# Patient Record
Sex: FEMALE | Race: WHITE | URBAN - METROPOLITAN AREA
[De-identification: names, ages, dates, MRNs, and addresses within clinical notes are randomized per-mention and may not be internally consistent; named-entity substitution may affect disease eponyms.]

---

## 2024-11-17 ENCOUNTER — HOSPITAL ENCOUNTER (INPATIENT)
Age: 24
LOS: 1 days | Discharge: HOME | DRG: 418 | End: 2024-11-19
Attending: EMERGENCY MEDICINE | Admitting: SURGERY
Payer: COMMERCIAL

## 2024-11-17 ENCOUNTER — APPOINTMENT (OUTPATIENT)
Dept: CT IMAGING | Age: 24
DRG: 418 | End: 2024-11-17
Payer: COMMERCIAL

## 2024-11-17 ENCOUNTER — APPOINTMENT (OUTPATIENT)
Dept: ULTRASOUND IMAGING | Age: 24
DRG: 418 | End: 2024-11-17
Payer: COMMERCIAL

## 2024-11-17 ENCOUNTER — APPOINTMENT (OUTPATIENT)
Dept: GENERAL RADIOLOGY | Age: 24
DRG: 418 | End: 2024-11-17
Payer: COMMERCIAL

## 2024-11-17 ENCOUNTER — HOSPITAL ENCOUNTER (EMERGENCY)
Age: 24
Discharge: HOME | DRG: 418 | End: 2024-11-17
Attending: EMERGENCY MEDICINE
Payer: COMMERCIAL

## 2024-11-17 VITALS
TEMPERATURE: 96.9 F | DIASTOLIC BLOOD PRESSURE: 100 MMHG | HEIGHT: 61 IN | RESPIRATION RATE: 18 BRPM | WEIGHT: 265.88 LBS | HEART RATE: 75 BPM | SYSTOLIC BLOOD PRESSURE: 143 MMHG | OXYGEN SATURATION: 97 % | BODY MASS INDEX: 50.2 KG/M2

## 2024-11-17 DIAGNOSIS — K80.20 SYMPTOMATIC CHOLELITHIASIS: ICD-10-CM

## 2024-11-17 DIAGNOSIS — R07.9 CHEST PAIN, UNSPECIFIED TYPE: Primary | ICD-10-CM

## 2024-11-17 DIAGNOSIS — K80.20 CALCULUS OF GALLBLADDER WITHOUT CHOLECYSTITIS WITHOUT OBSTRUCTION: Primary | ICD-10-CM

## 2024-11-17 LAB
ALBUMIN SERPL-MCNC: 4.7 G/DL (ref 3.5–5.2)
ALBUMIN/GLOB SERPL: 1.6 {RATIO} (ref 1–2.5)
ALP SERPL-CCNC: 66 U/L (ref 35–104)
ALT SERPL-CCNC: 100 U/L (ref 10–35)
ANION GAP SERPL CALCULATED.3IONS-SCNC: 12 MMOL/L (ref 9–16)
AST SERPL-CCNC: 54 U/L (ref 10–35)
BASOPHILS # BLD: 0.06 K/UL (ref 0–0.2)
BASOPHILS NFR BLD: 0 % (ref 0–2)
BILIRUB SERPL-MCNC: 0.4 MG/DL (ref 0–1.2)
BUN SERPL-MCNC: 11 MG/DL (ref 6–20)
CALCIUM SERPL-MCNC: 10.1 MG/DL (ref 8.6–10.4)
CHLORIDE SERPL-SCNC: 102 MMOL/L (ref 98–107)
CO2 SERPL-SCNC: 21 MMOL/L (ref 20–31)
CREAT SERPL-MCNC: 0.6 MG/DL (ref 0.6–0.9)
D DIMER PPP FEU-MCNC: 1.15 UG/ML FEU (ref 0–0.57)
EOSINOPHIL # BLD: 0.03 K/UL (ref 0–0.44)
EOSINOPHILS RELATIVE PERCENT: 0 % (ref 1–4)
ERYTHROCYTE [DISTWIDTH] IN BLOOD BY AUTOMATED COUNT: 12.4 % (ref 11.8–14.4)
GFR, ESTIMATED: >90 ML/MIN/1.73M2
GLUCOSE SERPL-MCNC: 147 MG/DL (ref 74–99)
HCG SERPL QL: NEGATIVE
HCT VFR BLD AUTO: 44.8 % (ref 36.3–47.1)
HGB BLD-MCNC: 14.8 G/DL (ref 11.9–15.1)
IMM GRANULOCYTES # BLD AUTO: 0.07 K/UL (ref 0–0.3)
IMM GRANULOCYTES NFR BLD: 1 %
LIPASE SERPL-CCNC: 42 U/L (ref 13–60)
LYMPHOCYTES NFR BLD: 2.18 K/UL (ref 1.1–3.7)
LYMPHOCYTES RELATIVE PERCENT: 16 % (ref 24–43)
MCH RBC QN AUTO: 29.5 PG (ref 25.2–33.5)
MCHC RBC AUTO-ENTMCNC: 33 G/DL (ref 28.4–34.8)
MCV RBC AUTO: 89.2 FL (ref 82.6–102.9)
MONOCYTES NFR BLD: 0.59 K/UL (ref 0.1–1.2)
MONOCYTES NFR BLD: 4 % (ref 3–12)
NEUTROPHILS NFR BLD: 79 % (ref 36–65)
NEUTS SEG NFR BLD: 10.76 K/UL (ref 1.5–8.1)
NRBC BLD-RTO: 0 PER 100 WBC
PLATELET # BLD AUTO: 346 K/UL (ref 138–453)
PMV BLD AUTO: 10.1 FL (ref 8.1–13.5)
POTASSIUM SERPL-SCNC: 3.8 MMOL/L (ref 3.7–5.3)
PROT SERPL-MCNC: 7.6 G/DL (ref 6.6–8.7)
RBC # BLD AUTO: 5.02 M/UL (ref 3.95–5.11)
SODIUM SERPL-SCNC: 135 MMOL/L (ref 136–145)
TROPONIN I SERPL HS-MCNC: <6 NG/L (ref 0–14)
WBC OTHER # BLD: 13.7 K/UL (ref 3.5–11.3)

## 2024-11-17 PROCEDURE — 99285 EMERGENCY DEPT VISIT HI MDM: CPT

## 2024-11-17 PROCEDURE — 2500000003 HC RX 250 WO HCPCS: Performed by: EMERGENCY MEDICINE

## 2024-11-17 PROCEDURE — 93005 ELECTROCARDIOGRAM TRACING: CPT

## 2024-11-17 PROCEDURE — 84484 ASSAY OF TROPONIN QUANT: CPT

## 2024-11-17 PROCEDURE — 2580000003 HC RX 258: Performed by: EMERGENCY MEDICINE

## 2024-11-17 PROCEDURE — 76705 ECHO EXAM OF ABDOMEN: CPT

## 2024-11-17 PROCEDURE — 85025 COMPLETE CBC W/AUTO DIFF WBC: CPT

## 2024-11-17 PROCEDURE — 84703 CHORIONIC GONADOTROPIN ASSAY: CPT

## 2024-11-17 PROCEDURE — 99284 EMERGENCY DEPT VISIT MOD MDM: CPT

## 2024-11-17 PROCEDURE — 6360000004 HC RX CONTRAST MEDICATION

## 2024-11-17 PROCEDURE — 6360000002 HC RX W HCPCS: Performed by: EMERGENCY MEDICINE

## 2024-11-17 PROCEDURE — 80053 COMPREHEN METABOLIC PANEL: CPT

## 2024-11-17 PROCEDURE — 71046 X-RAY EXAM CHEST 2 VIEWS: CPT

## 2024-11-17 PROCEDURE — 83690 ASSAY OF LIPASE: CPT

## 2024-11-17 PROCEDURE — 93005 ELECTROCARDIOGRAM TRACING: CPT | Performed by: EMERGENCY MEDICINE

## 2024-11-17 PROCEDURE — 6370000000 HC RX 637 (ALT 250 FOR IP)

## 2024-11-17 PROCEDURE — 96374 THER/PROPH/DIAG INJ IV PUSH: CPT

## 2024-11-17 PROCEDURE — 6360000002 HC RX W HCPCS

## 2024-11-17 PROCEDURE — 85379 FIBRIN DEGRADATION QUANT: CPT

## 2024-11-17 PROCEDURE — 71260 CT THORAX DX C+: CPT

## 2024-11-17 PROCEDURE — 96375 TX/PRO/DX INJ NEW DRUG ADDON: CPT

## 2024-11-17 RX ORDER — IOPAMIDOL 755 MG/ML
75 INJECTION, SOLUTION INTRAVASCULAR
Status: COMPLETED | OUTPATIENT
Start: 2024-11-17 | End: 2024-11-17

## 2024-11-17 RX ORDER — MAGNESIUM HYDROXIDE/ALUMINUM HYDROXICE/SIMETHICONE 120; 1200; 1200 MG/30ML; MG/30ML; MG/30ML
30 SUSPENSION ORAL ONCE
Status: COMPLETED | OUTPATIENT
Start: 2024-11-17 | End: 2024-11-17

## 2024-11-17 RX ORDER — FENTANYL CITRATE 50 UG/ML
50 INJECTION, SOLUTION INTRAMUSCULAR; INTRAVENOUS ONCE
Status: COMPLETED | OUTPATIENT
Start: 2024-11-17 | End: 2024-11-17

## 2024-11-17 RX ORDER — ONDANSETRON 2 MG/ML
4 INJECTION INTRAMUSCULAR; INTRAVENOUS ONCE
Status: COMPLETED | OUTPATIENT
Start: 2024-11-17 | End: 2024-11-17

## 2024-11-17 RX ORDER — ACETAMINOPHEN 500 MG
1000 TABLET ORAL ONCE
Status: COMPLETED | OUTPATIENT
Start: 2024-11-17 | End: 2024-11-17

## 2024-11-17 RX ORDER — FAMOTIDINE 20 MG/1
20 TABLET, FILM COATED ORAL 2 TIMES DAILY
Qty: 60 TABLET | Refills: 0 | Status: SHIPPED | OUTPATIENT
Start: 2024-11-17

## 2024-11-17 RX ADMIN — IOPAMIDOL 75 ML: 755 INJECTION, SOLUTION INTRAVENOUS at 21:27

## 2024-11-17 RX ADMIN — FENTANYL CITRATE 50 MCG: 50 INJECTION, SOLUTION INTRAMUSCULAR; INTRAVENOUS at 22:53

## 2024-11-17 RX ADMIN — ALUMINUM HYDROXIDE, MAGNESIUM HYDROXIDE, AND SIMETHICONE 30 ML: 200; 200; 20 SUSPENSION ORAL at 17:18

## 2024-11-17 RX ADMIN — ACETAMINOPHEN 1000 MG: 500 TABLET ORAL at 17:30

## 2024-11-17 RX ADMIN — ONDANSETRON 4 MG: 2 INJECTION INTRAMUSCULAR; INTRAVENOUS at 20:24

## 2024-11-17 RX ADMIN — FAMOTIDINE 20 MG: 10 INJECTION, SOLUTION INTRAVENOUS at 20:24

## 2024-11-17 ASSESSMENT — PAIN SCALES - GENERAL
PAINLEVEL_OUTOF10: 8
PAINLEVEL_OUTOF10: 6
PAINLEVEL_OUTOF10: 6
PAINLEVEL_OUTOF10: 7

## 2024-11-17 ASSESSMENT — PAIN DESCRIPTION - LOCATION
LOCATION: ABDOMEN
LOCATION: CHEST
LOCATION: CHEST

## 2024-11-17 ASSESSMENT — HEART SCORE: ECG: NORMAL

## 2024-11-17 ASSESSMENT — PAIN - FUNCTIONAL ASSESSMENT
PAIN_FUNCTIONAL_ASSESSMENT: 0-10
PAIN_FUNCTIONAL_ASSESSMENT: 0-10

## 2024-11-17 ASSESSMENT — LIFESTYLE VARIABLES: HOW OFTEN DO YOU HAVE A DRINK CONTAINING ALCOHOL: MONTHLY OR LESS

## 2024-11-17 NOTE — ED NOTES
Pt to ed with c/o chest heaviness, pressure.   Pt states the pain started at 1000, pressure, between her breasts.   Pt states she has insomnia, was getting ready for bed, laying in her bed when the pain started. Pt states she was able to get some sleep but the pain never full went away. Pt reports pain is worse with exertion.   Pt is alert, oriented, speaking in full, complete sentences.   Pt rates pain 6/10  Pt does not like needles, will wait for resident evaluation for orders.

## 2024-11-17 NOTE — ED PROVIDER NOTES
Physical Activity: Not on file   Stress: Not on file   Social Connections: Not on file   Intimate Partner Violence: Not on file   Housing Stability: Not on file       History reviewed. No pertinent family history.    Allergies:  Patient has no known allergies.    Home Medications:  Prior to Admission medications    Not on File         REVIEW OF SYSTEMS       Review of Systems   Constitutional:  Negative for chills and fever.   Respiratory:  Negative for shortness of breath.    Cardiovascular:  Positive for chest pain.   Gastrointestinal:  Negative for nausea and vomiting.       PHYSICAL EXAM      INITIAL VITALS:   BP (!) 143/100   Pulse 75   Temp 96.9 °F (36.1 °C) (Oral)   Resp 18   Ht 1.549 m (5' 1\")   Wt 120.6 kg (265 lb 14 oz)   SpO2 97%   BMI 50.24 kg/m²     Physical Exam  Constitutional:       General: She is not in acute distress.     Appearance: She is not ill-appearing.   HENT:      Head: Normocephalic and atraumatic.   Eyes:      Extraocular Movements: Extraocular movements intact.      Pupils: Pupils are equal, round, and reactive to light.   Cardiovascular:      Rate and Rhythm: Normal rate and regular rhythm.      Pulses: Normal pulses.      Heart sounds: Normal heart sounds. No murmur heard.  Pulmonary:      Effort: Pulmonary effort is normal. No respiratory distress.      Breath sounds: Normal breath sounds. No wheezing.   Abdominal:      General: Abdomen is flat. There is no distension.      Palpations: Abdomen is soft.      Tenderness: There is no abdominal tenderness.   Musculoskeletal:      Right lower leg: No edema.      Left lower leg: No edema.           DDX/DIAGNOSTIC RESULTS / EMERGENCY DEPARTMENT COURSE / MDM     Medical Decision Making  24-year-old female presents emergency department with midsternal chest pain.  On exam patient is nontoxic and well-appearing.  Patient has stable vital signs, patient is afebrile.  Patient has clear lung sounds bilaterally, abdomen is soft nontender  nondistended, moist mucous membranes.  Remainder of patient's physical exam is unremarkable.  Differential diagnosis: ACS/STEMI/NSTEMI, PE, pneumonia, musculoskeletal, GERD  Patient is PERC negative due to this PE is significantly less likely.  ACS/STEMI/NSTEMI is on the differential but is significantly less likely due to patient's age risk factors and normal physical exam.  Pneumonia is on the differential but is significantly less likely due to patient's normal vital signs, normal physical exam.  Most likely cause the patient's symptoms is GERD as patient had some relief in symptoms after Maalox administration.    Amount and/or Complexity of Data Reviewed  Radiology: ordered. Decision-making details documented in ED Course.  ECG/medicine tests: ordered.    Risk  OTC drugs.        EKG  Ventricular rate 73, QTc 427, QRS 76  Normal sinus rhythm, normal axis  T wave inversion in V1  No prior EKGs to compare to  Abnormal EKG    All EKG's are interpreted by the Emergency Department Physician who either signs or Co-signs this chart in the absence of a cardiologist.    EMERGENCY DEPARTMENT COURSE:      ED Course as of 11/17/24 1729   Sun Nov 17, 2024   1710 XR CHEST (2 VW)  IMPRESSION:  No acute process.   [MW]   1724 Patient's EKG and chest x-ray were unremarkable. [MW]   1725 Patient's heart score was 1.  At this time patient will be discharged with instructions to follow-up with PCP. [MW]      ED Course User Index  [MW] Ruddy Joyce MD       PROCEDURES:      CONSULTS:  None    CRITICAL CARE:  There was significant risk of life threatening deterioration of patient's condition requiring my direct management. Critical care time  minutes, excluding any documented procedures.    FINAL IMPRESSION      1. Chest pain, unspecified type          DISPOSITION / PLAN     DISPOSITION Decision To Discharge 11/17/2024 05:27:40 PM           PATIENT REFERRED TO:  [facility]  [address] [place] [address]

## 2024-11-17 NOTE — ED PROVIDER NOTES
[facility]  Emergency Department  Faculty Attestation     I performed a history and physical examination of the patient and discussed management with the resident. I reviewed the resident’s note and agree with the documented findings and plan of care. Any areas of disagreement are noted on the chart. I was personally present for the key portions of any procedures. I have documented in the chart those procedures where I was not present during the key portions. I have reviewed the emergency nurses triage note. I agree with the chief complaint, past medical history, past surgical history, allergies, medications, social and family history as documented unless otherwise noted below.    For Physician Assistant/ Nurse Practitioner cases/documentation I have personally evaluated this patient and have completed at least one if not all key elements of the E/M (history, physical exam, and MDM). Additional findings are as noted.    Preliminary note started at 4:58 PM EST    Primary Care Physician:  No primary care provider on file.    Screenings:  [unfilled]    CHIEF COMPLAINT       Chief Complaint   Patient presents with    Chest Pain       RECENT VITALS:   BP (!) 143/100   Pulse 75   Temp 96.9 °F (36.1 °C) (Oral)   Resp 18   Ht 1.549 m (5' 1\")   Wt 120.6 kg (265 lb 14 oz)   SpO2 97%   BMI 50.24 kg/m²     LABS:  Labs Reviewed - No data to display    Radiology  XR CHEST (2 VW)    (Results Pending)         EKG:  EKG Interpretation    Interpreted by me    Rhythm: normal sinus   Rate: normal  Axis: normal  Ectopy: none  Conduction: normal  ST Segments: no acute change  T Waves: no acute change  Q Waves: none    Clinical Impression: no acute changes and normal EKG    Attending Physician Additional  Notes    Patient is had well localized lower esophageal upper epigastrium discomfort with radiation along the costal margins since this morning associate with nausea but no vomiting.  No

## 2024-11-18 ENCOUNTER — ANESTHESIA EVENT (OUTPATIENT)
Dept: OPERATING ROOM | Age: 24
DRG: 418 | End: 2024-11-18
Payer: MEDICAID

## 2024-11-18 ENCOUNTER — ANESTHESIA (OUTPATIENT)
Dept: OPERATING ROOM | Age: 24
DRG: 418 | End: 2024-11-18
Payer: MEDICAID

## 2024-11-18 PROBLEM — K80.20 SYMPTOMATIC CHOLELITHIASIS: Status: ACTIVE | Noted: 2024-11-18

## 2024-11-18 LAB
ALBUMIN SERPL-MCNC: 3.9 G/DL (ref 3.5–5.2)
ALBUMIN/GLOB SERPL: 1.3 {RATIO} (ref 1–2.5)
ALP SERPL-CCNC: 57 U/L (ref 35–104)
ALT SERPL-CCNC: 75 U/L (ref 10–35)
ANION GAP SERPL CALCULATED.3IONS-SCNC: 12 MMOL/L (ref 9–16)
AST SERPL-CCNC: 38 U/L (ref 10–35)
BACTERIA URNS QL MICRO: NORMAL
BASOPHILS # BLD: 0.09 K/UL (ref 0–0.2)
BASOPHILS NFR BLD: 1 % (ref 0–2)
BILIRUB DIRECT SERPL-MCNC: 0.2 MG/DL (ref 0–0.2)
BILIRUB INDIRECT SERPL-MCNC: 0.4 MG/DL (ref 0–1)
BILIRUB SERPL-MCNC: 0.6 MG/DL (ref 0–1.2)
BILIRUB UR QL STRIP: NEGATIVE
BUN SERPL-MCNC: 8 MG/DL (ref 6–20)
CALCIUM SERPL-MCNC: 9.5 MG/DL (ref 8.6–10.4)
CASTS #/AREA URNS LPF: NORMAL /LPF (ref 0–8)
CHLORIDE SERPL-SCNC: 105 MMOL/L (ref 98–107)
CLARITY UR: CLEAR
CO2 SERPL-SCNC: 19 MMOL/L (ref 20–31)
COLOR UR: YELLOW
CREAT SERPL-MCNC: 0.6 MG/DL (ref 0.6–0.9)
EKG ATRIAL RATE: 69 BPM
EKG ATRIAL RATE: 73 BPM
EKG P AXIS: 10 DEGREES
EKG P AXIS: 23 DEGREES
EKG P-R INTERVAL: 142 MS
EKG P-R INTERVAL: 150 MS
EKG Q-T INTERVAL: 388 MS
EKG Q-T INTERVAL: 388 MS
EKG QRS DURATION: 76 MS
EKG QRS DURATION: 78 MS
EKG QTC CALCULATION (BAZETT): 415 MS
EKG QTC CALCULATION (BAZETT): 427 MS
EKG R AXIS: 10 DEGREES
EKG R AXIS: 17 DEGREES
EKG T AXIS: 18 DEGREES
EKG T AXIS: 5 DEGREES
EKG VENTRICULAR RATE: 69 BPM
EKG VENTRICULAR RATE: 73 BPM
EOSINOPHIL # BLD: 0.04 K/UL (ref 0–0.44)
EOSINOPHILS RELATIVE PERCENT: 0 % (ref 1–4)
EPI CELLS #/AREA URNS HPF: NORMAL /HPF (ref 0–5)
ERYTHROCYTE [DISTWIDTH] IN BLOOD BY AUTOMATED COUNT: 12.5 % (ref 11.8–14.4)
GFR, ESTIMATED: >90 ML/MIN/1.73M2
GLOBULIN SER CALC-MCNC: 3 G/DL
GLUCOSE SERPL-MCNC: 110 MG/DL (ref 74–99)
GLUCOSE UR STRIP-MCNC: NEGATIVE MG/DL
HCT VFR BLD AUTO: 45.8 % (ref 36.3–47.1)
HGB BLD-MCNC: 15.2 G/DL (ref 11.9–15.1)
HGB UR QL STRIP.AUTO: NEGATIVE
IMM GRANULOCYTES # BLD AUTO: 0.11 K/UL (ref 0–0.3)
IMM GRANULOCYTES NFR BLD: 1 %
KETONES UR STRIP-MCNC: ABNORMAL MG/DL
LEUKOCYTE ESTERASE UR QL STRIP: NEGATIVE
LYMPHOCYTES NFR BLD: 3.62 K/UL (ref 1.1–3.7)
LYMPHOCYTES RELATIVE PERCENT: 21 % (ref 24–43)
MAGNESIUM SERPL-MCNC: 2.3 MG/DL (ref 1.6–2.6)
MCH RBC QN AUTO: 29.3 PG (ref 25.2–33.5)
MCHC RBC AUTO-ENTMCNC: 33.2 G/DL (ref 28.4–34.8)
MCV RBC AUTO: 88.4 FL (ref 82.6–102.9)
MONOCYTES NFR BLD: 1.09 K/UL (ref 0.1–1.2)
MONOCYTES NFR BLD: 6 % (ref 3–12)
NEUTROPHILS NFR BLD: 71 % (ref 36–65)
NEUTS SEG NFR BLD: 12.07 K/UL (ref 1.5–8.1)
NITRITE UR QL STRIP: NEGATIVE
NRBC BLD-RTO: 0 PER 100 WBC
PH UR STRIP: 5.5 [PH] (ref 5–8)
PHOSPHATE SERPL-MCNC: 3.9 MG/DL (ref 2.5–4.5)
PLATELET # BLD AUTO: ABNORMAL K/UL (ref 138–453)
PLATELET, FLUORESCENCE: ABNORMAL K/UL (ref 138–453)
POTASSIUM SERPL-SCNC: 3.4 MMOL/L (ref 3.7–5.3)
PROT SERPL-MCNC: 6.9 G/DL (ref 6.6–8.7)
PROT UR STRIP-MCNC: ABNORMAL MG/DL
RBC # BLD AUTO: 5.18 M/UL (ref 3.95–5.11)
RBC #/AREA URNS HPF: NORMAL /HPF (ref 0–4)
SODIUM SERPL-SCNC: 136 MMOL/L (ref 136–145)
SP GR UR STRIP: 1.06 (ref 1–1.03)
UROBILINOGEN UR STRIP-ACNC: NORMAL EU/DL (ref 0–1)
WBC #/AREA URNS HPF: NORMAL /HPF (ref 0–5)
WBC OTHER # BLD: 17 K/UL (ref 3.5–11.3)

## 2024-11-18 PROCEDURE — 93010 ELECTROCARDIOGRAM REPORT: CPT | Performed by: INTERNAL MEDICINE

## 2024-11-18 PROCEDURE — 2500000003 HC RX 250 WO HCPCS: Performed by: SURGERY

## 2024-11-18 PROCEDURE — 6360000002 HC RX W HCPCS: Performed by: STUDENT IN AN ORGANIZED HEALTH CARE EDUCATION/TRAINING PROGRAM

## 2024-11-18 PROCEDURE — 85025 COMPLETE CBC W/AUTO DIFF WBC: CPT

## 2024-11-18 PROCEDURE — 7100000001 HC PACU RECOVERY - ADDTL 15 MIN: Performed by: SURGERY

## 2024-11-18 PROCEDURE — 0FT44ZZ RESECTION OF GALLBLADDER, PERCUTANEOUS ENDOSCOPIC APPROACH: ICD-10-PCS | Performed by: SURGERY

## 2024-11-18 PROCEDURE — 81001 URINALYSIS AUTO W/SCOPE: CPT

## 2024-11-18 PROCEDURE — C1889 IMPLANT/INSERT DEVICE, NOC: HCPCS | Performed by: SURGERY

## 2024-11-18 PROCEDURE — 6360000002 HC RX W HCPCS

## 2024-11-18 PROCEDURE — 36415 COLL VENOUS BLD VENIPUNCTURE: CPT

## 2024-11-18 PROCEDURE — 83735 ASSAY OF MAGNESIUM: CPT

## 2024-11-18 PROCEDURE — 3700000001 HC ADD 15 MINUTES (ANESTHESIA): Performed by: SURGERY

## 2024-11-18 PROCEDURE — 7100000000 HC PACU RECOVERY - FIRST 15 MIN: Performed by: SURGERY

## 2024-11-18 PROCEDURE — 96375 TX/PRO/DX INJ NEW DRUG ADDON: CPT

## 2024-11-18 PROCEDURE — 2580000003 HC RX 258: Performed by: STUDENT IN AN ORGANIZED HEALTH CARE EDUCATION/TRAINING PROGRAM

## 2024-11-18 PROCEDURE — 80048 BASIC METABOLIC PNL TOTAL CA: CPT

## 2024-11-18 PROCEDURE — 2500000003 HC RX 250 WO HCPCS

## 2024-11-18 PROCEDURE — 3700000000 HC ANESTHESIA ATTENDED CARE: Performed by: SURGERY

## 2024-11-18 PROCEDURE — 2709999900 HC NON-CHARGEABLE SUPPLY: Performed by: SURGERY

## 2024-11-18 PROCEDURE — S2900 ROBOTIC SURGICAL SYSTEM: HCPCS | Performed by: SURGERY

## 2024-11-18 PROCEDURE — 80076 HEPATIC FUNCTION PANEL: CPT

## 2024-11-18 PROCEDURE — 3600000009 HC SURGERY ROBOT BASE: Performed by: SURGERY

## 2024-11-18 PROCEDURE — 88304 TISSUE EXAM BY PATHOLOGIST: CPT

## 2024-11-18 PROCEDURE — 2580000003 HC RX 258

## 2024-11-18 PROCEDURE — 8E0W4CZ ROBOTIC ASSISTED PROCEDURE OF TRUNK REGION, PERCUTANEOUS ENDOSCOPIC APPROACH: ICD-10-PCS | Performed by: SURGERY

## 2024-11-18 PROCEDURE — 84100 ASSAY OF PHOSPHORUS: CPT

## 2024-11-18 PROCEDURE — 1200000000 HC SEMI PRIVATE

## 2024-11-18 PROCEDURE — 3600000019 HC SURGERY ROBOT ADDTL 15MIN: Performed by: SURGERY

## 2024-11-18 PROCEDURE — 99222 1ST HOSP IP/OBS MODERATE 55: CPT | Performed by: SURGERY

## 2024-11-18 PROCEDURE — 2580000003 HC RX 258: Performed by: SURGERY

## 2024-11-18 PROCEDURE — 2720000010 HC SURG SUPPLY STERILE: Performed by: SURGERY

## 2024-11-18 PROCEDURE — 47562 LAPAROSCOPIC CHOLECYSTECTOMY: CPT | Performed by: SURGERY

## 2024-11-18 PROCEDURE — 85055 RETICULATED PLATELET ASSAY: CPT

## 2024-11-18 DEVICE — HEMOLOK L 6 CLIPS/CART
Type: IMPLANTABLE DEVICE | Site: GALLBLADDER | Status: FUNCTIONAL
Brand: WECK

## 2024-11-18 RX ORDER — ENOXAPARIN SODIUM 100 MG/ML
40 INJECTION SUBCUTANEOUS DAILY
Status: DISCONTINUED | OUTPATIENT
Start: 2024-11-18 | End: 2024-11-18

## 2024-11-18 RX ORDER — DEXAMETHASONE SODIUM PHOSPHATE 10 MG/ML
INJECTION, SOLUTION INTRAMUSCULAR; INTRAVENOUS
Status: DISCONTINUED | OUTPATIENT
Start: 2024-11-18 | End: 2024-11-18 | Stop reason: SDUPTHER

## 2024-11-18 RX ORDER — OXYCODONE HYDROCHLORIDE 5 MG/1
5 TABLET ORAL EVERY 4 HOURS PRN
Status: DISCONTINUED | OUTPATIENT
Start: 2024-11-18 | End: 2024-11-19 | Stop reason: HOSPADM

## 2024-11-18 RX ORDER — NALOXONE HYDROCHLORIDE 0.4 MG/ML
INJECTION, SOLUTION INTRAMUSCULAR; INTRAVENOUS; SUBCUTANEOUS PRN
Status: CANCELLED | OUTPATIENT
Start: 2024-11-18

## 2024-11-18 RX ORDER — ONDANSETRON 4 MG/1
4 TABLET, ORALLY DISINTEGRATING ORAL EVERY 8 HOURS PRN
Status: DISCONTINUED | OUTPATIENT
Start: 2024-11-18 | End: 2024-11-19 | Stop reason: HOSPADM

## 2024-11-18 RX ORDER — GLYCOPYRROLATE 1 MG/5 ML
SYRINGE (ML) INTRAVENOUS
Status: DISCONTINUED | OUTPATIENT
Start: 2024-11-18 | End: 2024-11-18 | Stop reason: SDUPTHER

## 2024-11-18 RX ORDER — SODIUM CHLORIDE, SODIUM LACTATE, POTASSIUM CHLORIDE, CALCIUM CHLORIDE 600; 310; 30; 20 MG/100ML; MG/100ML; MG/100ML; MG/100ML
INJECTION, SOLUTION INTRAVENOUS CONTINUOUS
Status: CANCELLED | OUTPATIENT
Start: 2024-11-18

## 2024-11-18 RX ORDER — LABETALOL HYDROCHLORIDE 5 MG/ML
10 INJECTION, SOLUTION INTRAVENOUS
Status: CANCELLED | OUTPATIENT
Start: 2024-11-18

## 2024-11-18 RX ORDER — EPHEDRINE SULFATE/0.9% NACL/PF 25 MG/5 ML
SYRINGE (ML) INTRAVENOUS
Status: DISCONTINUED | OUTPATIENT
Start: 2024-11-18 | End: 2024-11-18 | Stop reason: SDUPTHER

## 2024-11-18 RX ORDER — MIDAZOLAM HYDROCHLORIDE 1 MG/ML
INJECTION, SOLUTION INTRAMUSCULAR; INTRAVENOUS
Status: DISCONTINUED | OUTPATIENT
Start: 2024-11-18 | End: 2024-11-18 | Stop reason: SDUPTHER

## 2024-11-18 RX ORDER — FENTANYL CITRATE 50 UG/ML
25 INJECTION, SOLUTION INTRAMUSCULAR; INTRAVENOUS EVERY 5 MIN PRN
Status: CANCELLED | OUTPATIENT
Start: 2024-11-18

## 2024-11-18 RX ORDER — BUPIVACAINE HYDROCHLORIDE AND EPINEPHRINE 5; 5 MG/ML; UG/ML
INJECTION, SOLUTION PERINEURAL PRN
Status: DISCONTINUED | OUTPATIENT
Start: 2024-11-18 | End: 2024-11-18 | Stop reason: ALTCHOICE

## 2024-11-18 RX ORDER — ACETAMINOPHEN 500 MG
1000 TABLET ORAL EVERY 6 HOURS SCHEDULED
Status: DISCONTINUED | OUTPATIENT
Start: 2024-11-18 | End: 2024-11-19 | Stop reason: HOSPADM

## 2024-11-18 RX ORDER — ONDANSETRON 2 MG/ML
INJECTION INTRAMUSCULAR; INTRAVENOUS
Status: DISCONTINUED | OUTPATIENT
Start: 2024-11-18 | End: 2024-11-18 | Stop reason: SDUPTHER

## 2024-11-18 RX ORDER — PROPOFOL 10 MG/ML
INJECTION, EMULSION INTRAVENOUS
Status: DISCONTINUED | OUTPATIENT
Start: 2024-11-18 | End: 2024-11-18 | Stop reason: SDUPTHER

## 2024-11-18 RX ORDER — FENTANYL CITRATE 50 UG/ML
INJECTION, SOLUTION INTRAMUSCULAR; INTRAVENOUS
Status: DISCONTINUED | OUTPATIENT
Start: 2024-11-18 | End: 2024-11-18 | Stop reason: SDUPTHER

## 2024-11-18 RX ORDER — SODIUM CHLORIDE 0.9 % (FLUSH) 0.9 %
5-40 SYRINGE (ML) INJECTION EVERY 12 HOURS SCHEDULED
Status: DISCONTINUED | OUTPATIENT
Start: 2024-11-18 | End: 2024-11-19 | Stop reason: HOSPADM

## 2024-11-18 RX ORDER — METHOCARBAMOL 750 MG/1
750 TABLET, FILM COATED ORAL EVERY 6 HOURS
Status: DISCONTINUED | OUTPATIENT
Start: 2024-11-18 | End: 2024-11-19 | Stop reason: HOSPADM

## 2024-11-18 RX ORDER — LIDOCAINE HYDROCHLORIDE 10 MG/ML
INJECTION, SOLUTION EPIDURAL; INFILTRATION; INTRACAUDAL; PERINEURAL
Status: DISCONTINUED | OUTPATIENT
Start: 2024-11-18 | End: 2024-11-18 | Stop reason: SDUPTHER

## 2024-11-18 RX ORDER — METOCLOPRAMIDE HYDROCHLORIDE 5 MG/ML
10 INJECTION INTRAMUSCULAR; INTRAVENOUS
Status: CANCELLED | OUTPATIENT
Start: 2024-11-18 | End: 2024-11-19

## 2024-11-18 RX ORDER — SODIUM CHLORIDE, SODIUM LACTATE, POTASSIUM CHLORIDE, CALCIUM CHLORIDE 600; 310; 30; 20 MG/100ML; MG/100ML; MG/100ML; MG/100ML
INJECTION, SOLUTION INTRAVENOUS CONTINUOUS
Status: DISCONTINUED | OUTPATIENT
Start: 2024-11-18 | End: 2024-11-18

## 2024-11-18 RX ORDER — SODIUM CHLORIDE 9 MG/ML
INJECTION, SOLUTION INTRAVENOUS PRN
Status: DISCONTINUED | OUTPATIENT
Start: 2024-11-18 | End: 2024-11-19 | Stop reason: HOSPADM

## 2024-11-18 RX ORDER — MORPHINE SULFATE 4 MG/ML
4 INJECTION INTRAVENOUS ONCE
Status: COMPLETED | OUTPATIENT
Start: 2024-11-18 | End: 2024-11-18

## 2024-11-18 RX ORDER — INDOCYANINE GREEN AND WATER 25 MG
5 KIT INJECTION ONCE
Status: COMPLETED | OUTPATIENT
Start: 2024-11-18 | End: 2024-11-18

## 2024-11-18 RX ORDER — MAGNESIUM HYDROXIDE 1200 MG/15ML
LIQUID ORAL CONTINUOUS PRN
Status: COMPLETED | OUTPATIENT
Start: 2024-11-18 | End: 2024-11-18

## 2024-11-18 RX ORDER — SENNA AND DOCUSATE SODIUM 50; 8.6 MG/1; MG/1
1 TABLET, FILM COATED ORAL 2 TIMES DAILY
Status: DISCONTINUED | OUTPATIENT
Start: 2024-11-18 | End: 2024-11-19 | Stop reason: HOSPADM

## 2024-11-18 RX ORDER — SODIUM CHLORIDE 0.9 % (FLUSH) 0.9 %
5-40 SYRINGE (ML) INJECTION PRN
Status: DISCONTINUED | OUTPATIENT
Start: 2024-11-18 | End: 2024-11-19 | Stop reason: HOSPADM

## 2024-11-18 RX ORDER — SODIUM CHLORIDE 0.9 % (FLUSH) 0.9 %
5-40 SYRINGE (ML) INJECTION EVERY 12 HOURS SCHEDULED
Status: CANCELLED | OUTPATIENT
Start: 2024-11-18

## 2024-11-18 RX ORDER — PROCHLORPERAZINE EDISYLATE 5 MG/ML
5 INJECTION INTRAMUSCULAR; INTRAVENOUS
Status: CANCELLED | OUTPATIENT
Start: 2024-11-18 | End: 2024-11-19

## 2024-11-18 RX ORDER — POLYETHYLENE GLYCOL 3350 17 G/17G
17 POWDER, FOR SOLUTION ORAL DAILY
Status: DISCONTINUED | OUTPATIENT
Start: 2024-11-18 | End: 2024-11-19 | Stop reason: HOSPADM

## 2024-11-18 RX ORDER — ENOXAPARIN SODIUM 100 MG/ML
40 INJECTION SUBCUTANEOUS 2 TIMES DAILY
Status: DISCONTINUED | OUTPATIENT
Start: 2024-11-18 | End: 2024-11-19 | Stop reason: HOSPADM

## 2024-11-18 RX ORDER — ONDANSETRON 2 MG/ML
4 INJECTION INTRAMUSCULAR; INTRAVENOUS EVERY 6 HOURS PRN
Status: DISCONTINUED | OUTPATIENT
Start: 2024-11-18 | End: 2024-11-19 | Stop reason: HOSPADM

## 2024-11-18 RX ORDER — ROCURONIUM BROMIDE 10 MG/ML
INJECTION, SOLUTION INTRAVENOUS
Status: DISCONTINUED | OUTPATIENT
Start: 2024-11-18 | End: 2024-11-18 | Stop reason: SDUPTHER

## 2024-11-18 RX ADMIN — DEXAMETHASONE SODIUM PHOSPHATE 10 MG: 10 INJECTION, SOLUTION INTRAMUSCULAR; INTRAVENOUS at 11:35

## 2024-11-18 RX ADMIN — MORPHINE SULFATE 4 MG: 4 INJECTION INTRAVENOUS at 01:33

## 2024-11-18 RX ADMIN — SODIUM CHLORIDE, PRESERVATIVE FREE 10 ML: 5 INJECTION INTRAVENOUS at 03:41

## 2024-11-18 RX ADMIN — ROCURONIUM BROMIDE 50 MG: 10 INJECTION, SOLUTION INTRAVENOUS at 11:24

## 2024-11-18 RX ADMIN — PHENYLEPHRINE HYDROCHLORIDE 100 MCG: 10 INJECTION INTRAVENOUS at 11:35

## 2024-11-18 RX ADMIN — PHENYLEPHRINE HYDROCHLORIDE 150 MCG: 10 INJECTION INTRAVENOUS at 11:50

## 2024-11-18 RX ADMIN — Medication 10 MG: at 12:24

## 2024-11-18 RX ADMIN — INDOCYANINE GREEN AND WATER 2.5 MG: KIT at 11:20

## 2024-11-18 RX ADMIN — SUGAMMADEX 300 MG: 100 INJECTION, SOLUTION INTRAVENOUS at 12:40

## 2024-11-18 RX ADMIN — FENTANYL CITRATE 100 MCG: 50 INJECTION, SOLUTION INTRAMUSCULAR; INTRAVENOUS at 11:24

## 2024-11-18 RX ADMIN — MIDAZOLAM 2 MG: 1 INJECTION INTRAMUSCULAR; INTRAVENOUS at 11:21

## 2024-11-18 RX ADMIN — SODIUM CHLORIDE, PRESERVATIVE FREE 10 ML: 5 INJECTION INTRAVENOUS at 20:38

## 2024-11-18 RX ADMIN — Medication 0.2 MG: at 11:24

## 2024-11-18 RX ADMIN — PROPOFOL 300 MG: 10 INJECTION, EMULSION INTRAVENOUS at 11:24

## 2024-11-18 RX ADMIN — PIPERACILLIN AND TAZOBACTAM 4500 MG: 4; .5 INJECTION, POWDER, FOR SOLUTION INTRAVENOUS at 15:04

## 2024-11-18 RX ADMIN — PIPERACILLIN AND TAZOBACTAM 4500 MG: 4; .5 INJECTION, POWDER, FOR SOLUTION INTRAVENOUS at 20:43

## 2024-11-18 RX ADMIN — SODIUM CHLORIDE, POTASSIUM CHLORIDE, SODIUM LACTATE AND CALCIUM CHLORIDE: 600; 310; 30; 20 INJECTION, SOLUTION INTRAVENOUS at 13:52

## 2024-11-18 RX ADMIN — PHENYLEPHRINE HYDROCHLORIDE 100 MCG: 10 INJECTION INTRAVENOUS at 12:25

## 2024-11-18 RX ADMIN — SODIUM CHLORIDE, POTASSIUM CHLORIDE, SODIUM LACTATE AND CALCIUM CHLORIDE: 600; 310; 30; 20 INJECTION, SOLUTION INTRAVENOUS at 04:04

## 2024-11-18 RX ADMIN — Medication 30 MG: at 11:40

## 2024-11-18 RX ADMIN — LIDOCAINE HYDROCHLORIDE 50 MG: 10 INJECTION, SOLUTION EPIDURAL; INFILTRATION; INTRACAUDAL; PERINEURAL at 11:24

## 2024-11-18 RX ADMIN — EPHEDRINE SULFATE 10 MG: 5 INJECTION INTRAVENOUS at 11:52

## 2024-11-18 RX ADMIN — SODIUM CHLORIDE, POTASSIUM CHLORIDE, SODIUM LACTATE AND CALCIUM CHLORIDE: 600; 310; 30; 20 INJECTION, SOLUTION INTRAVENOUS at 12:38

## 2024-11-18 RX ADMIN — ONDANSETRON 4 MG: 2 INJECTION INTRAMUSCULAR; INTRAVENOUS at 12:27

## 2024-11-18 RX ADMIN — Medication 2 G: at 11:35

## 2024-11-18 ASSESSMENT — PAIN SCALES - WONG BAKER
WONGBAKER_NUMERICALRESPONSE: NO HURT

## 2024-11-18 ASSESSMENT — PAIN DESCRIPTION - ONSET: ONSET: AWAKENED FROM SLEEP

## 2024-11-18 ASSESSMENT — PAIN DESCRIPTION - ORIENTATION
ORIENTATION: RIGHT;UPPER
ORIENTATION: RIGHT;UPPER

## 2024-11-18 ASSESSMENT — PAIN DESCRIPTION - LOCATION
LOCATION: ABDOMEN
LOCATION: ABDOMEN

## 2024-11-18 ASSESSMENT — PAIN DESCRIPTION - DESCRIPTORS
DESCRIPTORS: DULL;ACHING
DESCRIPTORS: ACHING;DULL

## 2024-11-18 ASSESSMENT — PAIN - FUNCTIONAL ASSESSMENT
PAIN_FUNCTIONAL_ASSESSMENT: ACTIVITIES ARE NOT PREVENTED
PAIN_FUNCTIONAL_ASSESSMENT: ACTIVITIES ARE NOT PREVENTED
PAIN_FUNCTIONAL_ASSESSMENT: 0-10

## 2024-11-18 ASSESSMENT — PAIN DESCRIPTION - PAIN TYPE: TYPE: ACUTE PAIN

## 2024-11-18 ASSESSMENT — PAIN DESCRIPTION - FREQUENCY: FREQUENCY: CONTINUOUS

## 2024-11-18 ASSESSMENT — PAIN SCALES - GENERAL
PAINLEVEL_OUTOF10: 1
PAINLEVEL_OUTOF10: 0
PAINLEVEL_OUTOF10: 1
PAINLEVEL_OUTOF10: 1
PAINLEVEL_OUTOF10: 0
PAINLEVEL_OUTOF10: 3

## 2024-11-18 ASSESSMENT — PAIN DESCRIPTION - DIRECTION: RADIATING_TOWARDS: DENIES

## 2024-11-18 ASSESSMENT — ENCOUNTER SYMPTOMS: NAUSEA: 1

## 2024-11-18 NOTE — ED PROVIDER NOTES
[facility]     Emergency Department     Faculty Attestation    I performed a history and physical examination of the patient and discussed management with the resident. I reviewed the resident’s note and agree with the documented findings and plan of care. Any areas of disagreement are noted on the chart. I was personally present for the key portions of any procedures. I have documented in the chart those procedures where I was not present during the key portions. I have reviewed the emergency nurses triage note. I agree with the chief complaint, past medical history, past surgical history, allergies, medications, social and family history as documented unless otherwise noted below.    For Physician Assistant/ Nurse Practitioner cases/documentation I have personally evaluated this patient and have completed at least one if not all key elements of the E/M (history, physical exam, and MDM). Additional findings are as noted.      Primary Care Physician:  No primary care provider on file.    CHIEF COMPLAINT       Chief Complaint   Patient presents with    Chest Pain    Nausea       RECENT VITALS:   Temp: 97.8 °F (36.6 °C),  Pulse: 98, Respirations: 20, BP: (!) 140/113    LABS:  Labs Reviewed - No data to display    Radiology  No orders to display         Attending Physician Additional  Notes    The patient is a 24-year-old female who presents for evaluation of chest pain.  She reports that she was seen earlier today for the same symptoms.  The patient had Chinese food for dinner last night.  She states that she woke up this morning with a sharp, stabbing, midsternal chest pain that radiates to her ribs when she takes a deep breath.  She complains of associated nausea but denies any vomiting.  She did not take any medications for symptoms at home.  When she was seen here in the emergency department she had some relief with Maalox and Tylenol.  Chest x-ray was unremarkable.  She was discharged

## 2024-11-18 NOTE — ED NOTES
The following labs were labeled with appropriate pt sticker and tubed to lab:     [x] Blue     [x] Lavender   [] on ice  [x] Green/yellow  [x] Green/black [] on ice  [] Meier  [] on ice  [x] Yellow  [] Red  [] Pink  [] Type/ Screen  [] ABG  [] VBG    [] COVID-19 swab    [] Rapid  [] PCR  [] Flu swab  [] Peds Viral Panel     [] Urine Sample  [] Fecal Sample  [] Pelvic Cultures  [] Blood Cultures  [] X 2  [] STREP Cultures

## 2024-11-18 NOTE — OP NOTE
Operative Note      Patient: Otoniel Vila  YOB: 2000  MRN: [id number]    Date of Procedure: 11/18/2024    Pre-Op Diagnosis Codes:      * Symptomatic cholelithiasis [K80.20]    Post-Op Diagnosis: Same and acute cholecystitis, hydrops gallbladder        Procedure(s):  ROBOTIC LAPAROSCOPIC CHOLECYSTECTOMY    Surgeon(s):  Danika Escalante MD    Assistant:   Resident: Monica Marquez DO    Anesthesia: General    Estimated Blood Loss (mL): less than 50     Complications: None    Specimens:   ID Type Source Tests Collected by Time Destination   A : GALLBLADDER AND CONTENTS Tissue Gallbladder SURGICAL PATHOLOGY Danika Escalante MD 11/18/2024 1157        Implants:  Implant Name Type Inv. Item Serial No.  Lot No. LRB No. Used Action   CLIP INT L POLYMER RENEE LIG HEM O RENEE (6EA/PK) - [id number]  CLIP INT L POLYMER RENEE LIG HEM O RENEE (6EA/PK)  [employer] MEDICAL- [id number] N/A 2 Implanted         Drains: * No LDAs found *    Findings:  Infection Present At Time Of Surgery (PATOS) (choose all levels that have infection present):  - Organ Space infection (below fascia) present as evidenced by fluid consistent with infection  Other Findings: hydrops gallbladder, acute cholecystitis     Detailed Description of Procedure:   Patient is a 24-year-old female with symptomatic cholelithiasis and acute cholecystitis.  Risks and benefits of robotic cholecystectomy, possible open were discussed with the patient and all questions were answered.  Patient provided written consent to proceed to the operating room.    Patient was brought to the operating room and placed in supine position.  General anesthesia was induced and endotracheal tube was used for airway control.  2 g of Ancef were given for antimicrobial coverage.  ICG was administered.  Timeout was performed confirming correct patient, procedure, allergies. Patient was prepped and draped in the usual sterile fashion.    Quarter percent Marcaine

## 2024-11-18 NOTE — ANESTHESIA PRE PROCEDURE
Q6H Jennifer Sandhu DO       • [Transfer Hold] enoxaparin (LOVENOX) injection 40 mg  40 mg SubCUTAneous Daily Jennifer Sandhu DO       • indocyanine green (IC-GREEN) syringe 5 mg  5 mg IntraVENous Once Danika Escalante MD       • sod chloride IRR soln 0.9 % irrigation    Continuous PRN Danika Escalante MD   1,000 mL at 11/18/24 1130       Allergies:  No Known Allergies    Problem List:    Patient Active Problem List   Diagnosis Code   • Calculus of gallbladder without cholecystitis without obstruction K80.20       Past Medical History:  No past medical history on file.    Past Surgical History:  No past surgical history on file.    Social History:    Social History     Tobacco Use   • Smoking status: Never   • Smokeless tobacco: Never   Substance Use Topics   • Alcohol use: Not Currently                                Counseling given: Not Answered      Vital Signs (Current):   Vitals:    11/17/24 2250 11/18/24 0315 11/18/24 0735 11/18/24 1035   BP: 122/82 116/84 131/78 (!) 139/94   Pulse: 85 84 79 (!) 102   Resp: 16 18 17 18   Temp:  98.1 °F (36.7 °C) 98.1 °F (36.7 °C) 96.8 °F (36 °C)   TempSrc:  Oral Oral Temporal   SpO2: 95% 100% 98% 95%   Weight:  119.5 kg (263 lb 7.2 oz)     Height:  1.549 m (5' 1\")                                                BP Readings from Last 3 Encounters:   11/18/24 (!) 139/94   11/17/24 (!) 143/100       NPO Status:                                                                                 BMI:   Wt Readings from Last 3 Encounters:   11/18/24 119.5 kg (263 lb 7.2 oz)   11/17/24 120.6 kg (265 lb 14 oz)     Body mass index is 49.78 kg/m².    CBC:   Lab Results   Component Value Date/Time    WBC 17.0 11/18/2024 06:41 AM    RBC 5.18 11/18/2024 06:41 AM    HGB 15.2 11/18/2024 06:41 AM    HCT 45.8 11/18/2024 06:41 AM    MCV 88.4 11/18/2024 06:41 AM    RDW 12.5 11/18/2024 06:41 AM    PLT See Reflexed IPF Result 11/18/2024 06:41 AM       CMP:   Lab Results   Component

## 2024-11-18 NOTE — ED NOTES
ED to inpatient nurses report      Chief Complaint:  Chief Complaint   Patient presents with    Nausea    Abdominal Pain     Upper abd     Present to ED from: Home    MOA:     LOC: alert and orientated to name, place, date  Mobility: Independent  Oxygen Baseline: RA    Current needs required: RA   Pending ED orders: na  Present condition: resting in ed cot    Why did the patient come to the ED? Pt arriving to ed 34 via triage with co of chest pain that started about an hour pta with nausea  Pt was recently seen this morning for same pain and was treated with GI meds and was ruled as GERD/acid reflux. Pt was unable to fill out prescriptions earlier today. No prior medical hx.   What is the plan? Symptom management for acute cholelithiasis and plan for surgery follow and possible removal of gall bladder   Any procedures or intervention occur? Line, labs, EKG, ct,     Mental Status:  Level of Consciousness: Alert (0)    Psych Assessment:   Psychosocial  Psychosocial (WDL): Within Defined Limits  Vital signs   Vitals:    11/17/24 2006 11/17/24 2133 11/17/24 2134 11/17/24 2250   BP: (!) 140/113 126/87  122/82   Pulse: 98  59 85   Resp: 20  14 16   Temp: 97.8 °F (36.6 °C)      TempSrc: Oral      SpO2: 98%  94% 95%   Weight: 120.6 kg (265 lb 14 oz)      Height: 1.549 m (5' 1\")           Vitals:  Patient Vitals for the past 24 hrs:   BP Temp Temp src Pulse Resp SpO2 Height Weight   11/17/24 2250 122/82 -- -- 85 16 95 % -- --   11/17/24 2134 -- -- -- 59 14 94 % -- --   11/17/24 2133 126/87 -- -- -- -- -- -- --   11/17/24 2006 (!) 140/113 97.8 °F (36.6 °C) Oral 98 20 98 % 1.549 m (5' 1\") 120.6 kg (265 lb 14 oz)      Visit Vitals  /82   Pulse 85   Temp 97.8 °F (36.6 °C) (Oral)   Resp 16   Ht 1.549 m (5' 1\")   Wt 120.6 kg (265 lb 14 oz)   SpO2 95%   BMI 50.24 kg/m²        LDAs:   Peripheral IV 11/17/24 Right Antecubital (Active)   Site Assessment Clean, dry & intact 11/17/24 2021   Line Status Brisk blood  following components:    D-Dimer, Quant 1.15 (*)     All other components within normal limits   LIPASE   HCG, SERUM, QUALITATIVE   TROPONIN   URINALYSIS WITH REFLEX TO CULTURE   BASIC METABOLIC PANEL W/ REFLEX TO MG FOR LOW K   CBC WITH AUTO DIFFERENTIAL   MAGNESIUM   PHOSPHORUS   HEPATIC FUNCTION PANEL       Electronically signed by Terry Guerrero RN on 11/18/2024 at 1:39 AM

## 2024-11-18 NOTE — BRIEF OP NOTE
Brief Postoperative Note      Patient: Otoniel Vila  YOB: 2000  MRN: [id number]    Date of Procedure: 11/18/2024    Pre-Op Diagnosis Codes:      * Symptomatic cholelithiasis [K80.20]    Post-Op Diagnosis: Same and acute cholecystitis, hydrops gallbladder       Procedure(s):  ROBOTIC LAPAROSCOPIC CHOLECYSTECTOMY    Surgeon(s):  Danika Escalante MD    Assistant:  Resident: Monica Marquez DO    Anesthesia: General    Estimated Blood Loss (mL): less than 50     Complications: None    Specimens:   ID Type Source Tests Collected by Time Destination   A : GALLBLADDER AND CONTENTS Tissue Gallbladder SURGICAL PATHOLOGY Danika Escalante MD 11/18/2024 1157        Implants:  Implant Name Type Inv. Item Serial No.  Lot No. LRB No. Used Action   CLIP INT L POLYMER RNEEE LIG HEM O RENEE (6EA/PK) - [id number]  CLIP INT L POLYMER RENEE LIG HEM O RENEE (6EA/PK)  [employer] MED[employer]- [id number] N/A 2 Implanted         Drains: * No LDAs found *    Findings:  Infection Present At Time Of Surgery (PATOS) (choose all levels that have infection present):  - Organ Space infection (below fascia) present as evidenced by fluid consistent with infection  Other Findings: hydrops gallbladder, acute cholecystitis    Electronically signed by Monica Marquez DO on 11/18/2024 at 12:53 PM

## 2024-11-18 NOTE — ED PROVIDER NOTES
[facility]   Emergency Department  Emergency Medicine Attending Sign-out   Note started: 11:27 PM EST    Care of Otoniel Vila was assumed from previous attending Dr. Campa at 11 PM and is being seen for Chest Pain and Nausea  .  The patient's initial evaluation and plan have been discussed with the prior provider who initially evaluated the patient.     Attestation  I was available and discussed any additional care issues that arose and coordinated the management plans with the resident(s) caring for the patient during my duty period. Any areas of disagreement with resident's documentation of care or procedures are noted on the chart. I was personally present for the key portions of any/all procedures, during my duty period. I have documented in the chart those procedures where I was not present during the key portions.     BRIEF PATIENT SUMMARY/MDM COURSE PER INITIAL PROVIDER:   RECENT VITALS:     Temp: 97.8 °F (36.6 °C),  Pulse: 85, Respirations: 16, BP: 122/82, SpO2: 95 %    This patient is a 24 y.o. Female with initial visit earlier seen for chest pain, at that time thought that she may have reflux and was given Maalox and Tylenol discharged home after feeling better.  However pain returned.  She had cardiac workup as well as a D-dimer.  D-dimer was elevated and therefore CT rule out PE was obtained which actually showed gallstones.  Has elevated white blood cell count at 13.7.  LFTs minimally elevated.    DIAGNOSTICS/MEDICATIONS:     MEDICATIONS GIVEN:  ED Medication Orders (From admission, onward)      Start Ordered     Status Ordering Provider    11/17/24 2300 11/17/24 2248  fentaNYL (SUBLIMAZE) injection 50 mcg  ONCE         Last MAR action: Given - by DENNY MANNING on 11/17/24 at 2253 MARIETTA GOTTLIEB    11/17/24 2118 11/17/24 2118  iopamidol (ISOVUE-370) 76 % injection 75 mL  IMG ONCE PRN         Last MAR action: Given - by DOLORES PALM on 11/17/24 at 2127 MARIETTA GOTTLIEB     11/17/24 2030 11/17/24 2019  famotidine (PEPCID) 20 mg in sodium chloride (PF) 0.9 % 10 mL injection  NOW         Last MAR action: Given - by YEIMI GOFF on 11/17/24 at 2024 DANIEL CARTWRIGHT    11/17/24 2030 11/17/24 2019  ondansetron (ZOFRAN) injection 4 mg  ONCE         Last MAR action: Given - by YEIMI GOFF on 11/17/24 at 2024 DANIEL CARTWRIGHT            LABS    Labs Reviewed   CBC WITH AUTO DIFFERENTIAL - Abnormal; Notable for the following components:       Result Value    WBC 13.7 (*)     Neutrophils % 79 (*)     Lymphocytes % 16 (*)     Eosinophils % 0 (*)     Immature Granulocytes % 1 (*)     Neutrophils Absolute 10.76 (*)     All other components within normal limits   COMPREHENSIVE METABOLIC PANEL - Abnormal; Notable for the following components:    Sodium 135 (*)     Glucose 147 (*)      (*)     AST 54 (*)     All other components within normal limits   D-DIMER, QUANTITATIVE - Abnormal; Notable for the following components:    D-Dimer, Quant 1.15 (*)     All other components within normal limits   LIPASE   HCG, SERUM, QUALITATIVE   TROPONIN       RADIOLOGY  CT CHEST PULMONARY EMBOLISM W CONTRAST    Result Date: 11/17/2024  EXAMINATION: CTA OF THE CHEST 11/17/2024 9:18 pm TECHNIQUE: CTA of the chest was performed after the administration of intravenous contrast.  Multiplanar reformatted images are provided for review.  MIP images are provided for review. Automated exposure control, iterative reconstruction, and/or weight based adjustment of the mA/kV was utilized to reduce the radiation dose to as low as reasonably achievable. COMPARISON: None. HISTORY: ORDERING SYSTEM PROVIDED HISTORY: Chest pain TECHNOLOGIST PROVIDED HISTORY: Chest pain Additional Contrast?->1 FINDINGS: Pulmonary Arteries: Pulmonary arteries are adequately opacified for evaluation.  No evidence of intraluminal filling defect to suggest pulmonary embolism.  Main pulmonary artery is normal in caliber. Mediastinum: No

## 2024-11-18 NOTE — ED PROVIDER NOTES
Constitutional:       Appearance: Normal appearance.   Cardiovascular:      Rate and Rhythm: Normal rate and regular rhythm.      Pulses: Normal pulses.      Heart sounds: Normal heart sounds.   Pulmonary:      Effort: Pulmonary effort is normal.      Breath sounds: Normal breath sounds and air entry.   Abdominal:      General: Abdomen is flat. Bowel sounds are normal.      Palpations: Abdomen is soft.      Tenderness: There is abdominal tenderness in the right upper quadrant. There is rebound. Positive signs include Hernandez's sign.   Neurological:      General: No focal deficit present.      Mental Status: She is alert.      GCS: GCS eye subscore is 4. GCS verbal subscore is 5. GCS motor subscore is 6.      Cranial Nerves: Cranial nerves 2-12 are intact.      Sensory: Sensation is intact.      Motor: Motor function is intact.           DDX/DIAGNOSTIC RESULTS / EMERGENCY DEPARTMENT COURSE / MDM     Medical Decision Making  Problem List / Differential Diagnosis: Differential diagnosis includes but is not limited to...    # ACS versus pulmonary embolism versus cholelithiasis versus acute cholecystitis versus GERD versus esophageal spasm    Plan: Troponin, lipase, CMP, D-dimer, hCG qualitative, CBC, CT chest pulmonary embolism with contrast    Reassessment / Response to Plan / Findings: In brief, 24-year-old female presenting to emergency department with a complaint of chest pain, difficulty with inhalation and nausea.    Will get troponin, lipase, CMP, CBC, hCG qualitative, D-dimer.  On patient's lab results, patient's D-dimer is 1.15, will add CT pulmonary embolism.  Patient CT pulmonary embolism is negative for pulmonary embolus although shows hepatic steatosis and cholelithiasis.  Will get ultrasound right upper quadrant and will give patient fentanyl 50 mcg and pain management.    Patient's ultrasound shows multiple tiny layering echogenic gallstones within the gallbladder.  There is no wall thickening or  Resident    (Please note that portions of thisnote were completed with a voice recognition program.  Efforts were made to edit the dictations but occasionally words are mis-transcribed.)

## 2024-11-18 NOTE — H&P
General Surgery  Consult    PATIENT NAME: Otoniel Vila  AGE: 24 y.o.  MEDICAL RECORD NO. [id number]  DATE: 11/18/2024  SURGEON: Kalpana Mackey MD  PRIMARY CARE PHYSICIAN: No primary care provider on file.    Patient evaluated at the request of  Dr. Szymanski  Reason for evaluation: cholelithiasis    Patient information was obtained from patient.  History/Exam limitations: none.    IMPRESSION:   25 yo female, with no pertinent medical history, presents with acute onset epigastric and right upper quadrant abdominal pain. Imaging shows cholelithiasis. Pt with leukocytosis on labs.    PLAN:   History, physical exam findings, and workup were discussed with the attending surgeon.   No emergent surgical intervention indicated at this time.  NPO, mIVF  UA pending  Tentative lap loc 11/18. Will obtain informed consent.      HISTORY:   History of Chief Complaint:    Otoniel Vila is a 24 y.o. female who presents with chest pain, acute onset epigastric and right upper quadrant abdominal pain. Pt states symptoms began yesterday after breakfast. Endorsing nausea, no emesis. Denies diarrhea, fever, or chills. Has not had abdominal pain like this before. Labs show leukocytosis, elevated D-dimer, elevated ALT and AST, no hyperbilirubinemia. Pt initially underwent cardiac workup with CT PE, which showed cholelithiasis, no pulmonary embolus. Was discharged but returned to ED as pain persisted. On my exam, she is afebrile, vital signs are within normal limits on room air. Pain is present but controlled with medications.      No prior cardiac history. Not on anticoagulation. Never had abdominal surgery.      Past Medical History   has no past medical history on file.  Past Surgical History   has no past surgical history on file.  Medications  Prior to Admission medications    Medication Sig Start Date End Date Taking? Authorizing Provider   famotidine (PEPCID) 20 MG tablet Take 1 tablet by mouth 2 times daily 11/17/24   Ruddy Joyce,  follow.    Jennifer Sandhu DO  11/18/2024, 12:59 AM

## 2024-11-18 NOTE — ED TRIAGE NOTES
Pt arriving to ed 34 via triage with co of chest pain that started about an hour pta with nausea  Pt was recently seen this morning for same pain and was treated with GI meds and was ruled as GERD/acid reflux. Pt was unable to fill out prescriptions earlier today. No prior medical hx.   Pt placed on continuous cardiac monitoring, BP, Pulse ox. EKG obtained. IV established and labs drawn.   Pt is resting on stretcher with call light within reach.  Breathing is non labored and no acute distress is noted.   Will continue to follow plan of care

## 2024-11-18 NOTE — CARE COORDINATION
Attempt to see patient for initial assessment. Patient off the unit to the OR. No family available in room. Will try again later as time allows.

## 2024-11-18 NOTE — ANESTHESIA POSTPROCEDURE EVALUATION
Department of Anesthesiology  Postprocedure Note    Patient: Otoniel Vila  MRN: [id number]  YOB: 2000  Date of evaluation: 11/18/2024    Procedure Summary       Date: 11/18/24 Room / Location: [address]    Anesthesia Start: 1114 Anesthesia Stop: 1258    Procedure: ROBOTIC LAPAROSCOPIC CHOLECYSTECTOMY Diagnosis:       Symptomatic cholelithiasis      (Symptomatic cholelithiasis [K80.20])    Surgeons: Danika Escalante MD Responsible Provider: Julio Atkinson MD    Anesthesia Type: general ASA Status: 3            Anesthesia Type: No value filed.    Cornelia Phase I: Cornelia Score: 8    Cornelia Phase II:      Anesthesia Post Evaluation    Patient location during evaluation: PACU  Patient participation: complete - patient participated  Level of consciousness: awake and alert  Airway patency: patent  Nausea & Vomiting: no nausea and no vomiting  Cardiovascular status: blood pressure returned to baseline  Respiratory status: acceptable  Hydration status: euvolemic  Comments: No known anesthesia related complication  Multimodal analgesia pain management approach  Pain management: adequate    No notable events documented.

## 2024-11-18 NOTE — PLAN OF CARE
Surgery Attending    23yo F with RUQ pain and ultrasound and CT showing cholelithiasis.     Patient still having abdominal pain today.  Abd Soft nondistended RUQ pain.    Likely has symptomatic cholelithiasis.     Discussed lazaro monterroso with the patient including the risks and benefits and she is agreeable to proceed.  Will plan for lazaro monterroso today.    Danika Escalante MD

## 2024-11-19 VITALS
HEIGHT: 61 IN | HEART RATE: 93 BPM | RESPIRATION RATE: 17 BRPM | SYSTOLIC BLOOD PRESSURE: 132 MMHG | BODY MASS INDEX: 49.74 KG/M2 | WEIGHT: 263.45 LBS | TEMPERATURE: 98.2 F | DIASTOLIC BLOOD PRESSURE: 87 MMHG | OXYGEN SATURATION: 95 %

## 2024-11-19 LAB
ALBUMIN SERPL-MCNC: 4.2 G/DL (ref 3.5–5.2)
ALBUMIN/GLOB SERPL: 1.6 {RATIO} (ref 1–2.5)
ALP SERPL-CCNC: 56 U/L (ref 35–104)
ALT SERPL-CCNC: 87 U/L (ref 10–35)
ANION GAP SERPL CALCULATED.3IONS-SCNC: 13 MMOL/L (ref 9–16)
AST SERPL-CCNC: 57 U/L (ref 10–35)
BASOPHILS # BLD: <0.03 K/UL (ref 0–0.2)
BASOPHILS NFR BLD: 0 % (ref 0–2)
BILIRUB DIRECT SERPL-MCNC: 0.2 MG/DL (ref 0–0.2)
BILIRUB INDIRECT SERPL-MCNC: 0.2 MG/DL (ref 0–1)
BILIRUB SERPL-MCNC: 0.4 MG/DL (ref 0–1.2)
BUN SERPL-MCNC: 8 MG/DL (ref 6–20)
CALCIUM SERPL-MCNC: 9.4 MG/DL (ref 8.6–10.4)
CHLORIDE SERPL-SCNC: 104 MMOL/L (ref 98–107)
CO2 SERPL-SCNC: 22 MMOL/L (ref 20–31)
CREAT SERPL-MCNC: 0.7 MG/DL (ref 0.6–0.9)
EOSINOPHIL # BLD: <0.03 K/UL (ref 0–0.44)
EOSINOPHILS RELATIVE PERCENT: 0 % (ref 1–4)
ERYTHROCYTE [DISTWIDTH] IN BLOOD BY AUTOMATED COUNT: 12.9 % (ref 11.8–14.4)
GFR, ESTIMATED: >90 ML/MIN/1.73M2
GLOBULIN SER CALC-MCNC: 2.6 G/DL
GLUCOSE SERPL-MCNC: 126 MG/DL (ref 74–99)
HCT VFR BLD AUTO: 42.2 % (ref 36.3–47.1)
HGB BLD-MCNC: 14 G/DL (ref 11.9–15.1)
IMM GRANULOCYTES # BLD AUTO: 0.11 K/UL (ref 0–0.3)
IMM GRANULOCYTES NFR BLD: 1 %
LYMPHOCYTES NFR BLD: 2.58 K/UL (ref 1.1–3.7)
LYMPHOCYTES RELATIVE PERCENT: 16 % (ref 24–43)
MAGNESIUM SERPL-MCNC: 2.3 MG/DL (ref 1.6–2.6)
MCH RBC QN AUTO: 29.1 PG (ref 25.2–33.5)
MCHC RBC AUTO-ENTMCNC: 33.2 G/DL (ref 28.4–34.8)
MCV RBC AUTO: 87.7 FL (ref 82.6–102.9)
MONOCYTES NFR BLD: 0.8 K/UL (ref 0.1–1.2)
MONOCYTES NFR BLD: 5 % (ref 3–12)
NEUTROPHILS NFR BLD: 78 % (ref 36–65)
NEUTS SEG NFR BLD: 12.25 K/UL (ref 1.5–8.1)
NRBC BLD-RTO: 0 PER 100 WBC
PHOSPHATE SERPL-MCNC: 3.8 MG/DL (ref 2.5–4.5)
PLATELET # BLD AUTO: ABNORMAL K/UL (ref 138–453)
PLATELET, FLUORESCENCE: 261 K/UL (ref 138–453)
PLATELETS.RETICULATED NFR BLD AUTO: 4.1 % (ref 1.1–10.3)
POTASSIUM SERPL-SCNC: 4.1 MMOL/L (ref 3.7–5.3)
PROT SERPL-MCNC: 6.8 G/DL (ref 6.6–8.7)
RBC # BLD AUTO: 4.81 M/UL (ref 3.95–5.11)
SODIUM SERPL-SCNC: 139 MMOL/L (ref 136–145)
SURGICAL PATHOLOGY REPORT: NORMAL
WBC OTHER # BLD: 15.8 K/UL (ref 3.5–11.3)

## 2024-11-19 PROCEDURE — 6370000000 HC RX 637 (ALT 250 FOR IP)

## 2024-11-19 PROCEDURE — 6370000000 HC RX 637 (ALT 250 FOR IP): Performed by: STUDENT IN AN ORGANIZED HEALTH CARE EDUCATION/TRAINING PROGRAM

## 2024-11-19 PROCEDURE — 85055 RETICULATED PLATELET ASSAY: CPT

## 2024-11-19 PROCEDURE — 36415 COLL VENOUS BLD VENIPUNCTURE: CPT

## 2024-11-19 PROCEDURE — 80076 HEPATIC FUNCTION PANEL: CPT

## 2024-11-19 PROCEDURE — 99024 POSTOP FOLLOW-UP VISIT: CPT | Performed by: SURGERY

## 2024-11-19 PROCEDURE — 84100 ASSAY OF PHOSPHORUS: CPT

## 2024-11-19 PROCEDURE — 6360000002 HC RX W HCPCS: Performed by: STUDENT IN AN ORGANIZED HEALTH CARE EDUCATION/TRAINING PROGRAM

## 2024-11-19 PROCEDURE — 83735 ASSAY OF MAGNESIUM: CPT

## 2024-11-19 PROCEDURE — 2580000003 HC RX 258: Performed by: STUDENT IN AN ORGANIZED HEALTH CARE EDUCATION/TRAINING PROGRAM

## 2024-11-19 PROCEDURE — 80048 BASIC METABOLIC PNL TOTAL CA: CPT

## 2024-11-19 PROCEDURE — 85025 COMPLETE CBC W/AUTO DIFF WBC: CPT

## 2024-11-19 RX ORDER — METHOCARBAMOL 750 MG/1
750 TABLET, FILM COATED ORAL EVERY 6 HOURS
Qty: 40 TABLET | Refills: 0 | Status: SHIPPED | OUTPATIENT
Start: 2024-11-19 | End: 2024-11-29

## 2024-11-19 RX ORDER — OXYCODONE HYDROCHLORIDE 5 MG/1
5 TABLET ORAL EVERY 6 HOURS PRN
Qty: 12 TABLET | Refills: 0 | Status: SHIPPED | OUTPATIENT
Start: 2024-11-19 | End: 2024-11-22

## 2024-11-19 RX ADMIN — METHOCARBAMOL 750 MG: 750 TABLET ORAL at 08:03

## 2024-11-19 RX ADMIN — ACETAMINOPHEN 1000 MG: 500 TABLET ORAL at 08:02

## 2024-11-19 RX ADMIN — SENNOSIDES AND DOCUSATE SODIUM 1 TABLET: 50; 8.6 TABLET ORAL at 08:03

## 2024-11-19 RX ADMIN — Medication 5 MG: at 01:49

## 2024-11-19 RX ADMIN — SODIUM CHLORIDE, PRESERVATIVE FREE 10 ML: 5 INJECTION INTRAVENOUS at 08:03

## 2024-11-19 RX ADMIN — PIPERACILLIN AND TAZOBACTAM 4500 MG: 4; .5 INJECTION, POWDER, FOR SOLUTION INTRAVENOUS at 05:44

## 2024-11-19 RX ADMIN — ENOXAPARIN SODIUM 40 MG: 100 INJECTION SUBCUTANEOUS at 08:02

## 2024-11-19 ASSESSMENT — PAIN DESCRIPTION - DESCRIPTORS
DESCRIPTORS: DISCOMFORT

## 2024-11-19 ASSESSMENT — PAIN DESCRIPTION - ORIENTATION
ORIENTATION: MID
ORIENTATION: MID
ORIENTATION: LEFT

## 2024-11-19 ASSESSMENT — PAIN SCALES - GENERAL
PAINLEVEL_OUTOF10: 3
PAINLEVEL_OUTOF10: 3
PAINLEVEL_OUTOF10: 4
PAINLEVEL_OUTOF10: 3

## 2024-11-19 ASSESSMENT — PAIN DESCRIPTION - LOCATION
LOCATION: ABDOMEN
LOCATION: ABDOMEN
LOCATION: SHOULDER
LOCATION: ABDOMEN

## 2024-11-19 NOTE — PLAN OF CARE
Problem: Discharge Planning  Goal: Discharge to home or other facility with appropriate resources  Outcome: Progressing     Problem: Pain  Goal: Verbalizes/displays adequate comfort level or baseline comfort level  Outcome: Progressing     Problem: ABCDS Injury Assessment  Goal: Absence of physical injury  Outcome: Progressing      06/13/2012   • Esophagogastroduodenoscopy  12/27/2016   • Esophagogastroduodenoscopy  12/30/1/1/1959     Quit date: 1/1/2005     Years since quitting: 15.normal bowel sounds, soft to palpation, nontender, no overt organomegaly noted, no masses, no rebound, no guarding noted.  Genitourinary:  No costovertebral angle tenderness.  Musculoskeletal:  No extremity edema, no joint tenderness noted, no deformities.   Skin:  Appropriate skin turgor for age, no rashes noted.  Neurologic:  Alert & oriented x 3, slowed mentation, cranial nerves 2-12 grossly symmetric and intact, strength and sensation symmetric and intact, no focal deficits noted.  No tremors, tics or ataxia.  Psychiatric:  Speech slowed but behavior appropriate, flat affect congruent to mood.     LAB  Recent Results (from the past 1008 hour(s))   MICROALBUMIN URINE RANDOM    Collection Time: 08/18/20  9:08 AM   Result Value Ref Range    Microalbumin, Urine 3.47 mg/dL    Creatinine, Urine 102.00 mg/dL    Microalbumin/ Creatinine Ratio 34.0 (H) <=29.0 mg/g   LIPID PANEL WITH REFLEX    Collection Time: 08/18/20  9:08 AM   Result Value Ref Range    Fasting Status 12 Hours    Cholesterol 155 <=199 mg/dL    Triglycerides 52 <=149 mg/dL    HDL 63 >=40 mg/dL    LDL 82 <=129 mg/dL    Non-HDL Cholesterol 92 mg/dL    Cholesterol/ HDL Ratio 2.5 <=4.4   COMPREHENSIVE METABOLIC PANEL    Collection Time: 08/18/20  9:08 AM   Result Value Ref Range    Fasting Status 12 Hours    Sodium 137 135 - 145 mmol/L    Potassium 4.6 3.4 - 5.1 mmol/L    Chloride 105 98 - 107 mmol/L    Carbon Dioxide 26 21 - 32 mmol/L    Anion Gap 11 10 - 20 mmol/L    Glucose 100 (H) 65 - 99 mg/dL    BUN 16 6 - 20 mg/dL    Creatinine 1.26 (H) 0.67 - 1.17 mg/dL    Glomerular Filtration Rate 53 (L) >90 mL/min/1.73m2    BUN/ Creatinine Ratio 13 7 - 25    Bilirubin, Total 0.7 0.2 - 1.0 mg/dL    GOT/AST 22 <=37 Units/L    Alkaline Phosphatase 70 45 - 117 Units/L    Albumin 3.8 3.6 - 5.1 g/dL    Protein, Total 7.0 6.4 - 8.2 g/dL    Globulin 3.2 2.0 - 4.0 g/dL    A/G Ratio 1.2 1.0 - 2.4    GPT/ALT 25 <64 Units/L    Calcium 8.8 8.4 - 10.2 mg/dL   THYROID  

## 2024-11-19 NOTE — PROGRESS NOTES
POST OP NOTE    SUBJECTIVE  Pt s/p laparoscopic cholecystectomy. Pain is controlled on current regimen. Tolerating clear liquid diet. Passing flatus, no bowel movements. She is voiding.     OBJECTIVE  VITALS:  /81   Pulse 90   Temp 98.2 °F (36.8 °C) (Oral)   Resp 21   Ht 1.549 m (5' 1\")   Wt 119.5 kg (263 lb 7.2 oz)   SpO2 96%   BMI 49.78 kg/m²         GENERAL:  awake and alert.   CARDIOVASCULAR:  regular rate and rhythm   LUNGS:  symmetric chest wall expansion, no accessory muscle use  ABDOMEN:   Abdomen soft, non-tender, non-distended  INCISION: Incision clean/dry/intact    ASSESSMENT  1. POD# 0 s/p laparoscopic cholecystectomy    PLAN  1. Pain management: MMPT  2. DVT proph: Lovenox BID  3. GI proph: Pepcid  4. Cont IV Zosyn  5. Cont CLD, advance tena Sandhu DO  Trauma/Surgery Service  11/18/2024 at 7:11 PM

## 2024-11-19 NOTE — PLAN OF CARE
Problem: Discharge Planning  Goal: Discharge to home or other facility with appropriate resources  11/19/2024 1406 by Deisi Bateman RN  Outcome: Adequate for Discharge  11/19/2024 0323 by Oliver Valenzuela RN  Outcome: Progressing     Problem: Pain  Goal: Verbalizes/displays adequate comfort level or baseline comfort level  11/19/2024 1406 by Deisi Bateman RN  Outcome: Adequate for Discharge  11/19/2024 0323 by Oliver Valenzuela RN  Outcome: Progressing     Problem: ABCDS Injury Assessment  Goal: Absence of physical injury  11/19/2024 1406 by Deisi Bateman RN  Outcome: Adequate for Discharge  11/19/2024 0323 by Oliver Valenzuela RN  Outcome: Progressing

## 2024-11-19 NOTE — PROGRESS NOTES
PROGRESS NOTE    PATIENT NAME: Otoniel Vila  MEDICAL RECORD NO. [id number]  DATE: 2024  SURGEON: Dr. Escalante  PRIMARY CARE PHYSICIAN: No primary care provider on file.    HD: # 1    ASSESSMENT    Patient Active Problem List   Diagnosis    Calculus of gallbladder without cholecystitis without obstruction   24-year-old female is POD #1 after robotic cholecystectomy.  Patient is recovering well.    MEDICAL DECISION MAKING AND PLAN    Advance diet to regular low-fat diet.  Discontinue IV fluids  Abdominal binder to be applied  If patient tolerates regular low-fat diet, patient can be discharged home.  Multimodal pain therapy for pain control.   DVT Prophylaxis-okay to give      Chief Complaint: \"Pain in her operative site managed with pain  medicine\"    SUBJECTIVE    Otoniel Vila is has improved since yesterday.  Tolerating pain with pain medications.  Does not have any nausea or vomiting.  Tolerating clear liquid diet.  Does not complain of any fever/chest pain, shortness of breath.      OBJECTIVE  VITALS: Temp: Temp: 97.7 °F (36.5 °C)Temp  Av.1 °F (36.7 °C)  Min: 97.6 °F (36.4 °C)  Max: 99.3 °F (37.4 °C) BP Systolic (24hrs), Av , Min:104 , Max:141   Diastolic (24hrs), Av, Min:62, Max:98   Pulse Pulse  Av.5  Min: 60  Max: 106 Resp Resp  Av.6  Min: 17  Max: 22 Pulse ox SpO2  Av.1 %  Min: 94 %  Max: 98 %  GENERAL: alert, no distress  NEURO: GCS 15  HEENT: No abnormality detected  : deferred  LUNGS: clear to ausculation, without wheezes, rales or rhonci  HEART: normal rate and regular rhythm  ABDOMEN: soft, non-tender, non-distended, bowel sounds present in all 4 quadrants, and no guarding or peritoneal signs present.  Incisions look clean dry and intact.  EXTREMITY: no cyanosis, clubbing or edema    I/O last 3 completed shifts:  In: 1551 [P.O.:500; I.V.:1000; IV Piggyback:51]  Out: 760 [Urine:750; Blood:10]    Drain/tube output:  In: 1551 [P.O.:500; I.V.:1000]  Out: 760    Medicare Wellness  Personal Prevention Plan of Service     Date of Office Visit:    Encounter Provider:  CORNELL Barnett  Place of Service:  [facility]  Patient Name: Navi Kinney  :  1941    As part of the Medicare Wellness portion of your visit today, we are providing you with this personalized preventive plan of services (PPPS). This plan is based upon recommendations of the [place] Preventive Services Task Force (USPSTF) and the Advisory Committee on Immunization Practices (ACIP).    This lists the preventive care services that should be considered, and provides dates of when you are due. Items listed as completed are up-to-date and do not require any further intervention.    Health Maintenance   Topic Date Due    ZOSTER VACCINE (1 of 2) Never done    URINE MICROALBUMIN  2022    HEMOGLOBIN A1C  03/10/2022    MAMMOGRAM  2022    DIABETIC EYE EXAM  2022    LIPID PANEL  09/10/2022    ANNUAL WELLNESS VISIT  2023    DXA SCAN  2023    COLORECTAL CANCER SCREENING  2024    TDAP/TD VACCINES (2 - Td or Tdap) 2027    COVID-19 Vaccine  Completed    INFLUENZA VACCINE  Completed    Pneumococcal Vaccine 65+  Completed       No orders of the defined types were placed in this encounter.      No follow-ups on file.

## 2024-11-19 NOTE — PROGRESS NOTES
Spiritual Health History and Assessment/Progress Note  [facility]    (P) Initial Encounter,  ,  ,      Name: Otoniel Vila MRN: [id number]    Age: 24 y.o.     Sex: female   Language: English   Worship: Atheist   Calculus of gallbladder without cholecystitis without obstruction     Date: 11/18/2024            Total Time Calculated: (P) 10 min              Spiritual Assessment began in [facility] ORTHO/MED SURG        Referral/Consult From: (P) Rounding   Encounter Overview/Reason: (P) Initial Encounter  Service Provided For: (P) Patient    Kathi, Belief, Meaning:   Patient Other: Patient stated Atheism.  Family/Friends No family/friends present      Importance and Influence:  Patient has no beliefs influential to healthcare decision-making identified during this visit  Family/Friends No family/friends present    Community:  Patient Other: Patient was speaking to family member on phone during visit.  Family/Friends No family/friends present    Assessment and Plan of Care:     Patient Interventions include: Other:  provided a supportive presence through active listening and words of affirmation.  Family/Friends Interventions include: No family/friends present    Patient Plan of Care: Spiritual Care available upon further referral  Family/Friends Plan of Care: No family/friends present    Electronically signed by Chaplain Emiliana on 11/18/2024 at 8:15 PM

## 2024-11-19 NOTE — DISCHARGE INSTRUCTIONS
Discharge Instructions for General Surgery    No lifting above 10Ibs for next 2 weeks.  No soaking in bathtubs or submerging yourself in water for 4 weeks  Resume activity as tolerated, No operating heavy equipment while using narcotics Wash incision gently with soap and water, pat dry.  If steri-strips or surgical glue in place wash gently and leave in place until the glue or strips fall off. (Do not pull/tug)    F/u in trauma/acute care surgery clinic in 1-2 weeks Call your doctor for the following:  Chills  Temperature greater than 101  Pain that is not tolerable despite taking pain medicine as ordered There is increased swelling, redness or warmth at surgical site There is increased drainage or bleeding from surgical site    Recommended diet: regular diet low fat  Depending on your injuries, your doctor may want you to follow a specific diet. Some pain medicine can cause constipation . To avoid this problem:  Drink plenty of fluids.  Eat foods high in fiber , such as:  Whole grain cereals and bread  Fruits and vegtables   Legumes (eg, beans, lentils)      If you are still having problems, talk to your doctor about using laxatives or stool softeners.    General questions or concerns call [phone] for the General Surgery Clinic.  If needed, the clinic fax number is [phone]

## 2024-11-20 NOTE — DISCHARGE SUMMARY
DISCHARGE SUMMARY:    PATIENT NAME:  Otoniel Vila  YOB: 2000  MEDICAL RECORD NO. [id number]  DATE: 11/20/24  PRIMARY CARE PHYSICIAN: No primary care provider on file.  ADMIT DATE:  11/17/2024    DISCHARGE DATE:  11/19/2024  DISPOSITION:  Home  ADMITTING DIAGNOSIS:   cholelithiasis    DIAGNOSIS:   Patient Active Problem List   Diagnosis    Calculus of gallbladder without cholecystitis without obstruction       CONSULTANTS:  None    PROCEDURES:   11/18: OR-robotic laparoscopic cholecystectomy    HOSPITAL COURSE:   Otoniel Vila is a 24 y.o. female who was admitted on 11/17/2024  Hospital Course:  CC: cholelithiasis     Hospital Course:  11/17/2024 CT PE shows cholelithiasis  11/18: OR robo loc  11/19: regular, low fat diet    Labs and imaging were followed.      On day of discharge Otoniel Vila  was tolerating a regular diet  She was deemed medically stable for discharge to Home        PHYSICAL EXAMINATION:        Discharge Vitals:  height is 1.549 m (5' 1\") and weight is 119.5 kg (263 lb 7.2 oz). Her oral temperature is 98.2 °F (36.8 °C). Her blood pressure is 132/87 and her pulse is 93. Her respiration is 17 and oxygen saturation is 95%.     LABS:     Recent Labs     11/17/24 2019 11/18/24  0641 11/18/24  0818 11/19/24  0537   WBC 13.7* 17.0*  --  15.8*   HGB 14.8 15.2*  --  14.0   HCT 44.8 45.8  --  42.2    See Reflexed IPF Result  --  See Reflexed IPF Result   *  --  136 139   K 3.8  --  3.4* 4.1     --  105 104   CO2 21  --  19* 22   BUN 11  --  8 8   CREATININE 0.6  --  0.6 0.7       DIAGNOSTIC TESTS:    US GALLBLADDER RUQ    Result Date: 11/17/2024  EXAMINATION: RIGHT UPPER QUADRANT ULTRASOUND 11/17/2024 10:53 pm COMPARISON: None. HISTORY: ORDERING SYSTEM PROVIDED HISTORY: Suspicion for cholelithiasis TECHNOLOGIST PROVIDED HISTORY: Suspicion for cholelithiasis FINDINGS: LIVER:  The liver demonstrates normal echogenicity without evidence of intrahepatic biliary ductal dilatation.

## 2024-11-20 NOTE — PROGRESS NOTES
CLINICAL PHARMACY NOTE: MEDS TO BEDS    Total # of Prescriptions Filled: 2   The following medications were delivered to the patient:  Oxycodone  robaxin    Additional Documentation:

## 2024-11-26 ENCOUNTER — OFFICE VISIT (OUTPATIENT)
Age: 24
End: 2024-11-26

## 2024-11-26 VITALS
WEIGHT: 263 LBS | SYSTOLIC BLOOD PRESSURE: 140 MMHG | HEIGHT: 61 IN | BODY MASS INDEX: 49.65 KG/M2 | HEART RATE: 99 BPM | DIASTOLIC BLOOD PRESSURE: 101 MMHG

## 2024-11-26 DIAGNOSIS — Z90.49 S/P LAPAROSCOPIC CHOLECYSTECTOMY: Primary | ICD-10-CM

## 2024-11-26 PROCEDURE — 99024 POSTOP FOLLOW-UP VISIT: CPT | Performed by: NURSE PRACTITIONER

## 2024-11-26 NOTE — PROGRESS NOTES
General Surgery   [id number] [facility], [facility] [address]  [place] [address]  [phone]  [id number] [place] [address]  [phone]  [email or web address]    Clinic Note - Post-op Patient      Patient: Otoniel Vila  MRN: [id number]  YOB: 2000 (24 y.o.)    Date of Office Visit: November 26, 2024     CC: Post op follow up    SUBJECTIVE:  Otoniel Vila is a 24 y.o. female who is seen at the [facility] surgery clinic for post op follow up from lazaro monterroso 11/18. Eating a regular diet without difficulty. Bowel movements are Normal.  The patient is not having any pain. She denies fevers or any issues with the incisions.       Physical Exam:    Vitals:    11/26/24 1217   BP: (!) 140/101   Pulse: 99     Physical Exam  Constitutional:       General: She is not in acute distress.     Appearance: Normal appearance. She is not ill-appearing.   Cardiovascular:      Rate and Rhythm: Normal rate.   Pulmonary:      Effort: Pulmonary effort is normal.   Abdominal:      General: There is no distension.      Palpations: Abdomen is soft.   Skin:     General: Skin is cool.      Findings: No wound.   Neurological:      General: No focal deficit present.      Mental Status: She is alert and oriented to person, place, and time.         Pathology:     11/18/24  Received in formalin is a 7.6 x 3.5 x 1.4 cm intact gallbladder.  The serosa is tan-pink and hyperemic.  The adventitia is roughened.  There is a red, gran  ular mucosa with a wall up to 0.7 cm.  Within the lumen is scant green bile and multiple green-yellow choleliths and fragments from < 0.1 to 0.5 cm.  No lesions or periductal lymph nodes are identified.     Assessment/Plan:  S/p robo lap loc 11/18  Pathology reviewed and discussed, patient states understanding  Incisions healing well, no concern for infection  Instructed patient to call the office with any concerns        We will order the following:  No orders of the

## 2025-03-12 ENCOUNTER — HOSPITAL ENCOUNTER (EMERGENCY)
Age: 25
Discharge: HOME | End: 2025-03-12
Attending: EMERGENCY MEDICINE
Payer: COMMERCIAL

## 2025-03-12 VITALS
WEIGHT: 259.7 LBS | DIASTOLIC BLOOD PRESSURE: 114 MMHG | TEMPERATURE: 97.5 F | HEIGHT: 61 IN | SYSTOLIC BLOOD PRESSURE: 153 MMHG | BODY MASS INDEX: 49.03 KG/M2 | OXYGEN SATURATION: 95 % | HEART RATE: 97 BPM | RESPIRATION RATE: 18 BRPM
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
## 2025-03-12 DIAGNOSIS — M25.512 ACUTE PAIN OF LEFT SHOULDER: ICD-10-CM

## 2025-03-12 PROCEDURE — 6370000000 HC RX 637 (ALT 250 FOR IP)

## 2025-03-12 PROCEDURE — 99283 EMERGENCY DEPT VISIT LOW MDM: CPT

## 2025-03-12 RX ORDER — CETIRIZINE HYDROCHLORIDE 10 MG/1
10 TABLET ORAL ONCE
Status: COMPLETED | OUTPATIENT
Start: 2025-03-12 | End: 2025-03-12

## 2025-03-12 RX ORDER — LIDOCAINE 4 G/G
1 PATCH TOPICAL ONCE
Status: DISCONTINUED | OUTPATIENT
Start: 2025-03-12 | End: 2025-03-12 | Stop reason: HOSPADM

## 2025-03-12 RX ORDER — BENZOCAINE/MENTHOL 6 MG-10 MG
LOZENGE MUCOUS MEMBRANE
Qty: 453 G | Refills: 0 | Status: SHIPPED | OUTPATIENT
Start: 2025-03-12 | End: 2025-03-19

## 2025-03-12 RX ORDER — CETIRIZINE HYDROCHLORIDE 10 MG/1
10 TABLET ORAL DAILY
Qty: 10 TABLET | Refills: 0 | Status: SHIPPED | OUTPATIENT
Start: 2025-03-12 | End: 2025-03-22

## 2025-03-12 RX ORDER — BENZOCAINE/MENTHOL 6 MG-10 MG
LOZENGE MUCOUS MEMBRANE ONCE
Status: COMPLETED | OUTPATIENT
Start: 2025-03-12 | End: 2025-03-12

## 2025-03-12 RX ORDER — IBUPROFEN 600 MG/1
600 TABLET, FILM COATED ORAL EVERY 8 HOURS PRN
Qty: 30 TABLET | Refills: 0 | Status: SHIPPED | OUTPATIENT
Start: 2025-03-12 | End: 2025-03-22

## 2025-03-12 RX ORDER — LIDOCAINE 50 MG/G
1 PATCH TOPICAL DAILY
Qty: 10 PATCH | Refills: 0 | Status: SHIPPED | OUTPATIENT
Start: 2025-03-12 | End: 2025-03-22

## 2025-03-12 RX ORDER — IBUPROFEN 400 MG/1
400 TABLET, FILM COATED ORAL ONCE
Status: COMPLETED | OUTPATIENT
Start: 2025-03-12 | End: 2025-03-12

## 2025-03-12 RX ADMIN — HYDROCORTISONE: 10 CREAM TOPICAL at 12:54

## 2025-03-12 RX ADMIN — IBUPROFEN 400 MG: 400 TABLET, FILM COATED ORAL at 12:51

## 2025-03-12 RX ADMIN — CETIRIZINE HYDROCHLORIDE 10 MG: 10 TABLET, FILM COATED ORAL at 12:51

## 2025-03-12 ASSESSMENT — LIFESTYLE VARIABLES
HOW MANY STANDARD DRINKS CONTAINING ALCOHOL DO YOU HAVE ON A TYPICAL DAY: PATIENT DOES NOT DRINK
HOW OFTEN DO YOU HAVE A DRINK CONTAINING ALCOHOL: NEVER

## 2025-03-12 ASSESSMENT — PAIN DESCRIPTION - DESCRIPTORS: DESCRIPTORS: ACHING

## 2025-03-12 ASSESSMENT — PAIN - FUNCTIONAL ASSESSMENT
PAIN_FUNCTIONAL_ASSESSMENT: NONE - DENIES PAIN
PAIN_FUNCTIONAL_ASSESSMENT: ACTIVITIES ARE NOT PREVENTED

## 2025-03-12 ASSESSMENT — PAIN SCALES - GENERAL: PAINLEVEL_OUTOF10: 3

## 2025-03-12 ASSESSMENT — PAIN DESCRIPTION - ORIENTATION: ORIENTATION: LEFT;UPPER

## 2025-03-12 ASSESSMENT — PAIN DESCRIPTION - LOCATION: LOCATION: ARM

## 2025-03-12 NOTE — DISCHARGE INSTRUCTIONS
You were seen due to concerns for bedbug bites, left shoulder pain.  I do feel your bites are likely due to an insect of some sort.  You should make sure that you get a new mattress and clean all your close and very hot water at home.  You can take Zyrtec to help with itching and apply hydrocortisone cream to the bite areas.  You can use lidocaine patches for shoulder pain as well as ibuprofen.  You need to follow-up outpatient with a primary care doctor in the next week for reevaluation.  Return the emergency department immediately for any worsening severe redness, bleeding, drainage, chest pain, shortness of breath, dizziness or loss of consciousness, numbness, weakness, other new or concerning symptoms

## 2025-03-12 NOTE — ED PROVIDER NOTES
[facility]     Emergency Department     Faculty Attestation    I performed a history and physical examination of the patient and discussed management with the resident. I reviewed the resident’s note and agree with the documented findings and plan of care. Any areas of disagreement are noted on the chart. I was personally present for the key portions of any procedures. I have documented in the chart those procedures where I was not present during the key portions. I have reviewed the emergency nurses triage note. I agree with the chief complaint, past medical history, past surgical history, allergies, medications, social and family history as documented unless otherwise noted below. Documentation of the HPI, Physical Exam and Medical Decision Making performed by medical students or scribes is based on my personal performance of the HPI, PE and MDM. For Physician Assistant/ Nurse Practitioner cases/documentation I have personally evaluated this patient and have completed at least one if not all key elements of the E/M (history, physical exam, and MDM). Additional findings are as noted.    Vital signs:   Vitals:    03/12/25 1254   BP:    Pulse: 97   Resp:    Temp:    SpO2:       Bedbug bites without associated infection. Muscle strain right arm.             Makayla Oliva M.D,  Attending Emergency  Physician            Makayla Oliva MD  03/12/25 1313

## 2025-03-12 NOTE — ED NOTES
Pt arrived to ED alert and oriented x4 via triage.  Pt c/o arm pain and bug bites.  Pt reports she believes she pulled a muscle in her LUE near her bicep/tricep area, denies known injury.  Pt reports she was not able to sleep d/t the pain, denies taking medication for pain.  Pt reports that she has been getting bit by bed bugs, states she is currently residing at the [facility] and there are bed bugs.  Pt reports being bit in BUE and her back, denies taking medication for itching.  Pt denies having been around anyone suspected to have COVID-19 or anyone that has been sick, denies recent travel outside the state of [place] or [place].  RR even and unlabored.   NAD noted.   Whiteboard updated.  Will continue with plan of care.

## 2025-03-12 NOTE — ED PROVIDER NOTES
[facility] EMERGENCY DEPARTMENT  Emergency Department Encounter  Emergency Medicine Resident     Pt Name:Otoniel Vila  MRN: [id number]  Birthdate 2000  Date of evaluation: 3/12/25  PCP:  No primary care provider on file.  Note Started: 12:36 PM EDT      CHIEF COMPLAINT       Chief Complaint   Patient presents with    Arm Pain    Insect Bite       HISTORY OF PRESENT ILLNESS  (Location/Symptom, Timing/Onset, Context/Setting, Quality, Duration, Modifying Factors, Severity.)      Otoniel Vila is a 25 y.o. female who presents with concerns for bedbug bites, left arm pain.  Patient [facility]  States she is yesterday she noticed itching bites across the posterior upper arms and upper back.  States she did see bugs on her bed where she is living currently.  Patient is concern for bedbugs.  She notes concern for home which is her itching.  She has not taken anything for with her symptoms.  Patient also notes left posterior arm pain.  States she also woke up yesterday morning and noticed soreness in her left arm.  No new injury to this area.  Arm pain is worse with extension.  No numbness, tingling, weakness of the left upper extremities.  Did not take anything at home for pain.  She has no associated chest pain or shortness of breath with this.  No fevers, chills    PAST MEDICAL / SURGICAL / SOCIAL / FAMILY HISTORY      has no past medical history on file.       has a past surgical history that includes Cholecystectomy, laparoscopic (11/18/2024) and Cholecystectomy, laparoscopic (N/A, 11/18/2024).      Social History     Socioeconomic History    Marital status: Single     Spouse name: Not on file    Number of children: Not on file    Years of education: Not on file    Highest education level: Not on file   Occupational History    Not on file   Tobacco Use    Smoking status: Never    Smokeless tobacco: Never   Vaping Use    Vaping status: Every Day   Substance  bedbugs, close outpatient follow-up with primary care.  Given return precautions.  Patient medically stable for discharge    Risk  OTC drugs.  Prescription drug management.            EMERGENCY DEPARTMENT COURSE:      ED Course as of 03/12/25 1320   Wed Mar 12, 2025   1255 Pulse: 97 [TD]      ED Course User Index  [TD] Meenu Ruby DO         CONSULTS:  None        FINAL IMPRESSION      1. Bug bite, initial encounter    2. Acute pain of left shoulder          DISPOSITION / PLAN     DISPOSITION Decision To Discharge 03/12/2025 01:11:11 PM   DISPOSITION CONDITION Stable           PATIENT REFERRED TO:  [facility] AT [facility]  [address] [place] [id number] [phone]  Schedule an appointment as soon as possible for a visit         DISCHARGE MEDICATIONS:  Discharge Medication List as of 3/12/2025  1:11 PM        START taking these medications    Details   cetirizine (ZYRTEC) 10 MG tablet Take 1 tablet by mouth daily for 10 days, Disp-10 tablet, R-0Normal      hydrocortisone 1 % cream Apply topically 2 -3 times daily for 5 - 7 days., Disp-453 g, R-0, Normal      ibuprofen (ADVIL;MOTRIN) 600 MG tablet Take 1 tablet by mouth every 8 hours as needed for Pain, Disp-30 tablet, R-0Normal      lidocaine (LIDODERM) 5 % Place 1 patch onto the skin daily for 10 days 12 hours on, 12 hours off., Disp-10 patch, R-0Normal             Meenu Ruby DO  Emergency Medicine Resident    (Please note that portions of this note were completed with a voice recognition program.  Efforts were made to edit the dictations but occasionally words are mis-transcribed.)

## (undated) DEVICE — TROCAR: Brand: KII FIOS FIRST ENTRY

## (undated) DEVICE — TOWEL,OR,DSP,ST,NATURAL,DLX,4/PK,20PK/CS: Brand: MEDLINE

## (undated) DEVICE — ELECTRODE PT RET AD L9FT HI MOIST COND ADH HYDRGEL CORDED

## (undated) DEVICE — INSUFFLATION NEEDLE TO ESTABLISH PNEUMOPERITONEUM.: Brand: INSUFFLATION NEEDLE

## (undated) DEVICE — SUCTION IRRIGATOR: Brand: ENDOWRIST

## (undated) DEVICE — SOLUTION ANTIFOG VIS SYS CLEARIFY LAPSCP

## (undated) DEVICE — INSUFFLATION TUBING SET WITH FILTER, FUNNEL CONNECTOR AND LUER LOCK: Brand: JOSNOE MEDICAL INC

## (undated) DEVICE — SEAL

## (undated) DEVICE — ARM DRAPE

## (undated) DEVICE — SUTURE VICRYL + SZ 0 L27IN ABSRB VLT L26MM UR-6 5/8 CIR VCP603H

## (undated) DEVICE — 3M™ IOBAN™ 2 ANTIMICROBIAL INCISE DRAPE 6650EZ: Brand: IOBAN™ 2

## (undated) DEVICE — LIQUIBAND RAPID ADHESIVE 36/CS 0.8ML: Brand: MEDLINE

## (undated) DEVICE — SUTURE MONOCRYL SZ 4-0 L18IN ABSRB UD L16MM PC-3 3/8 CIR PRIM Y845G

## (undated) DEVICE — DEVICE TRCR 12X9X3IN WHT CLSR DISP OMNICLOSE

## (undated) DEVICE — APPLICATOR MEDICATED 26 CC SOLUTION HI LT ORNG CHLORAPREP

## (undated) DEVICE — STRAP ARMBRD W1.5XL32IN FOAM STR YET SFT W/ HK AND LOOP

## (undated) DEVICE — BLADELESS OBTURATOR: Brand: WECK VISTA

## (undated) DEVICE — Device

## (undated) DEVICE — STRAP,POSITIONING,KNEE/BODY,FOAM,4X60": Brand: MEDLINE

## (undated) DEVICE — ELECTRO LUBE IS A SINGLE PATIENT USE DEVICE THAT IS INTENDED TO BE USED ON ELECTROSURGICAL ELECTRODES TO REDUCE STICKING.: Brand: KEY SURGICAL ELECTRO LUBE

## (undated) DEVICE — COVER,LIGHT HANDLE,FLX,2/PK: Brand: MEDLINE INDUSTRIES, INC.